# Patient Record
Sex: FEMALE | Race: BLACK OR AFRICAN AMERICAN | Employment: FULL TIME | URBAN - METROPOLITAN AREA
[De-identification: names, ages, dates, MRNs, and addresses within clinical notes are randomized per-mention and may not be internally consistent; named-entity substitution may affect disease eponyms.]

---

## 2017-02-13 ENCOUNTER — OFFICE VISIT (OUTPATIENT)
Dept: FAMILY MEDICINE CLINIC | Age: 47
End: 2017-02-13

## 2017-02-13 VITALS
DIASTOLIC BLOOD PRESSURE: 70 MMHG | WEIGHT: 174 LBS | SYSTOLIC BLOOD PRESSURE: 110 MMHG | RESPIRATION RATE: 14 BRPM | TEMPERATURE: 98 F | HEART RATE: 63 BPM | BODY MASS INDEX: 26.07 KG/M2 | OXYGEN SATURATION: 98 %

## 2017-02-13 DIAGNOSIS — J06.9 VIRAL URI WITH COUGH: ICD-10-CM

## 2017-02-13 DIAGNOSIS — R05.9 COUGH: ICD-10-CM

## 2017-02-13 LAB
INFLUENZA A ANTIBODY: NEGATIVE
INFLUENZA B ANTIBODY: NEGATIVE

## 2017-02-13 PROCEDURE — 87804 INFLUENZA ASSAY W/OPTIC: CPT | Performed by: NURSE PRACTITIONER

## 2017-02-13 PROCEDURE — 99213 OFFICE O/P EST LOW 20 MIN: CPT | Performed by: NURSE PRACTITIONER

## 2017-02-13 RX ORDER — ESTRADIOL 1 MG/1
1 TABLET ORAL DAILY
COMMUNITY
End: 2019-06-10

## 2017-02-13 RX ORDER — FLUTICASONE PROPIONATE 50 MCG
2 SPRAY, SUSPENSION (ML) NASAL DAILY
Qty: 1 BOTTLE | Refills: 0 | Status: SHIPPED | OUTPATIENT
Start: 2017-02-13 | End: 2017-11-20 | Stop reason: ALTCHOICE

## 2017-02-13 ASSESSMENT — ENCOUNTER SYMPTOMS
COUGH: 1
SHORTNESS OF BREATH: 0
NAUSEA: 0
SINUS PAIN: 1
RHINORRHEA: 1
ABDOMINAL PAIN: 0
SORE THROAT: 1
WHEEZING: 0
DIARRHEA: 0
VOMITING: 0

## 2017-06-07 ENCOUNTER — OFFICE VISIT (OUTPATIENT)
Dept: FAMILY MEDICINE CLINIC | Age: 47
End: 2017-06-07

## 2017-06-07 VITALS
DIASTOLIC BLOOD PRESSURE: 80 MMHG | SYSTOLIC BLOOD PRESSURE: 110 MMHG | HEART RATE: 68 BPM | TEMPERATURE: 97.9 F | RESPIRATION RATE: 12 BRPM | BODY MASS INDEX: 26.82 KG/M2 | WEIGHT: 179 LBS

## 2017-06-07 DIAGNOSIS — Z98.890 H/O COLONOSCOPY: ICD-10-CM

## 2017-06-07 DIAGNOSIS — Z12.11 SCREENING FOR COLON CANCER: ICD-10-CM

## 2017-06-07 DIAGNOSIS — K52.9 AGE (ACUTE GASTROENTERITIS): Primary | ICD-10-CM

## 2017-06-07 PROCEDURE — 99213 OFFICE O/P EST LOW 20 MIN: CPT | Performed by: FAMILY MEDICINE

## 2017-06-07 RX ORDER — CITALOPRAM 20 MG/1
20 TABLET ORAL DAILY
Qty: 30 TABLET | Refills: 3 | COMMUNITY
Start: 2017-06-07 | End: 2017-11-20

## 2017-08-05 RX ORDER — LORAZEPAM 0.5 MG/1
TABLET ORAL
Qty: 4 TABLET | Refills: 0 | Status: SHIPPED | OUTPATIENT
Start: 2017-08-05 | End: 2022-06-03 | Stop reason: SDUPTHER

## 2017-11-20 ENCOUNTER — OFFICE VISIT (OUTPATIENT)
Dept: FAMILY MEDICINE CLINIC | Age: 47
End: 2017-11-20

## 2017-11-20 VITALS
BODY MASS INDEX: 28.29 KG/M2 | RESPIRATION RATE: 12 BRPM | HEART RATE: 64 BPM | HEIGHT: 69 IN | DIASTOLIC BLOOD PRESSURE: 60 MMHG | SYSTOLIC BLOOD PRESSURE: 128 MMHG | WEIGHT: 191 LBS | TEMPERATURE: 98.8 F

## 2017-11-20 DIAGNOSIS — Z13.220 SCREENING, LIPID: ICD-10-CM

## 2017-11-20 DIAGNOSIS — E66.3 OVERWEIGHT (BMI 25.0-29.9): ICD-10-CM

## 2017-11-20 DIAGNOSIS — Z00.00 WELL ADULT EXAM: Primary | ICD-10-CM

## 2017-11-20 DIAGNOSIS — Z13.1 SCREENING FOR DIABETES MELLITUS: ICD-10-CM

## 2017-11-20 PROCEDURE — 99396 PREV VISIT EST AGE 40-64: CPT | Performed by: FAMILY MEDICINE

## 2017-11-20 ASSESSMENT — ENCOUNTER SYMPTOMS
GASTROINTESTINAL NEGATIVE: 1
ALLERGIC/IMMUNOLOGIC NEGATIVE: 1
RESPIRATORY NEGATIVE: 1
EYES NEGATIVE: 1

## 2017-11-20 NOTE — PROGRESS NOTES
Subjective:      Patient ID: Ortega Nickerson is a 49 y.o. female. Blood pressure 128/60, pulse 64, temperature 98.8 °F (37.1 °C), temperature source Oral, resp. rate 12, height 5' 8.5\" (1.74 m), weight 191 lb (86.6 kg), not currently breastfeeding. HPI her fro cpx/wellness exam.  Had LADAN/BSO last year for heavy painful periods. Had endometriosis. Doing better since, but has gained weight. Is concerned about this. She goes to gym to exercise, but less since her surgery. About 3-4 days a week for 30-60 min. Probably is eating more. Post surgery had poor po intake and decreased appetite, was eating Acquanetta Se during this time and stopped it. Now back on usual diet (which is likely more calories than the Acquanetta Se planned diet). Lab Results   Component Value Date    CHOL 159 02/08/2016    TRIG 43 02/08/2016    HDL 85 (H) 02/08/2016    LDLCALC 65 02/08/2016     Lab Results   Component Value Date    ALT 9 (L) 02/08/2016    AST 21 02/08/2016        Is not fasting today. Has normal renal function   Lab Results   Component Value Date     02/08/2016    K 3.9 02/08/2016    BUN 12 02/08/2016    CREATININE 0.9 02/08/2016        Dental: sees dentist at least annually. Sees optometrist at least every 2 yrs. No results found for: LABA1C  No results found for: EAG     Patient Active Problem List   Diagnosis    H/O colonoscopy 2004 (for IBS). benign polyps removed.  Obesity (BMI 30-39. 9)      Body mass index is 28.62 kg/m². Wt Readings from Last 3 Encounters:   11/20/17 191 lb (86.6 kg)   06/07/17 179 lb (81.2 kg)   02/13/17 174 lb (78.9 kg)      BP Readings from Last 3 Encounters:   11/20/17 128/60   06/07/17 110/80   02/13/17 110/70      Current Outpatient Prescriptions   Medication Sig Dispense Refill    estradiol (ESTRACE) 1 MG tablet Take 1 mg by mouth daily       No current facility-administered medications for this visit.        Immunization History   Administered Date(s) Administered    Tdap (Boostrix, Adacel) 11/01/2004, 01/13/2016        Family History   Problem Relation Age of Onset    High Blood Pressure Mother     Dementia Mother     High Blood Pressure Sister     Diabetes Sister     Heart Disease Father      CAD     Social History     Social History    Marital status: Single     Spouse name: N/A    Number of children: 0    Years of education: N/A     Occupational History    call       [employer]     Social History Main Topics    Smoking status: Never Smoker    Smokeless tobacco: Never Used    Alcohol use 0.0 oz/week      Comment: socially    Drug use: No    Sexual activity: Yes     Partners: Male     Other Topics Concern    Not on file     Social History Narrative    No narrative on file        Review of Systems   Constitutional: Negative. HENT: Negative. Eyes: Negative. Respiratory: Negative. Cardiovascular: Negative. Gastrointestinal: Negative. Endocrine: Negative. Genitourinary: Negative. Musculoskeletal: Negative. Skin: Negative. Allergic/Immunologic: Negative. Neurological: Negative. Hematological: Negative. Psychiatric/Behavioral: Negative. Objective:   Physical Exam   Constitutional: She is oriented to person, place, and time. She appears well-developed and well-nourished. No distress. HENT:   Head: Normocephalic and atraumatic. Right Ear: External ear normal.   Left Ear: External ear normal.   Nose: Nose normal.   Mouth/Throat: Oropharynx is clear and moist. No oropharyngeal exudate. TM's: nl  Canals: nl  Hearing: nl   Eyes: Conjunctivae and EOM are normal. Pupils are equal, round, and reactive to light. Right eye exhibits no discharge. Left eye exhibits no discharge. Neck: Normal range of motion. Neck supple. No tracheal deviation present. No thyromegaly present. Cardiovascular: Normal rate, regular rhythm, normal heart sounds and intact distal pulses.   Exam reveals no gallop and no

## 2018-04-03 ENCOUNTER — TELEPHONE (OUTPATIENT)
Dept: FAMILY MEDICINE CLINIC | Age: 48
End: 2018-04-03

## 2018-11-19 ENCOUNTER — TELEPHONE (OUTPATIENT)
Dept: FAMILY MEDICINE CLINIC | Age: 48
End: 2018-11-19

## 2018-11-19 NOTE — TELEPHONE ENCOUNTER
All the [facility] doctors are [facility] certified as far as I know, but which doctor she sees would be based on her particular problem. She is due for routine annual check up. I would prefer to see her before ordering labs so that I on ly order what is needed and NOT order things that are not needed. Thanks.

## 2018-11-19 NOTE — TELEPHONE ENCOUNTER
Pt called back I gave the pt the message. The pt stated that she will not schedule a visit due to her insurance will not pay for a office visit that is not Work mis-comp related. The pt asked for an ortho doctor and I gave her a list of [facility] doctors. The pt said that she will call back if she needs anything else. Please advise,    Thanks.

## 2018-11-28 ENCOUNTER — OFFICE VISIT (OUTPATIENT)
Dept: ORTHOPEDIC SURGERY | Age: 48
End: 2018-11-28
Payer: COMMERCIAL

## 2018-11-28 VITALS
SYSTOLIC BLOOD PRESSURE: 166 MMHG | HEIGHT: 69 IN | BODY MASS INDEX: 28.29 KG/M2 | HEART RATE: 70 BPM | WEIGHT: 191 LBS | DIASTOLIC BLOOD PRESSURE: 96 MMHG

## 2018-11-28 DIAGNOSIS — M25.572 LEFT ANKLE PAIN, UNSPECIFIED CHRONICITY: ICD-10-CM

## 2018-11-28 DIAGNOSIS — S93.492A SPRAIN OF ANTERIOR TALOFIBULAR LIGAMENT OF LEFT ANKLE, INITIAL ENCOUNTER: Primary | ICD-10-CM

## 2018-11-28 PROCEDURE — 99243 OFF/OP CNSLTJ NEW/EST LOW 30: CPT | Performed by: ORTHOPAEDIC SURGERY

## 2018-12-01 PROBLEM — S93.492A SPRAIN OF ANTERIOR TALOFIBULAR LIGAMENT OF LEFT ANKLE: Status: ACTIVE | Noted: 2018-12-01

## 2018-12-28 ENCOUNTER — OFFICE VISIT (OUTPATIENT)
Dept: ORTHOPEDIC SURGERY | Age: 48
End: 2018-12-28
Payer: COMMERCIAL

## 2018-12-28 VITALS
HEART RATE: 79 BPM | SYSTOLIC BLOOD PRESSURE: 141 MMHG | WEIGHT: 191 LBS | HEIGHT: 68 IN | BODY MASS INDEX: 28.95 KG/M2 | DIASTOLIC BLOOD PRESSURE: 95 MMHG | RESPIRATION RATE: 16 BRPM

## 2018-12-28 DIAGNOSIS — S93.492A SPRAIN OF ANTERIOR TALOFIBULAR LIGAMENT OF LEFT ANKLE, INITIAL ENCOUNTER: Primary | ICD-10-CM

## 2018-12-28 PROCEDURE — 99213 OFFICE O/P EST LOW 20 MIN: CPT | Performed by: NURSE PRACTITIONER

## 2019-01-24 ENCOUNTER — TELEPHONE (OUTPATIENT)
Dept: ORTHOPEDIC SURGERY | Age: 49
End: 2019-01-24

## 2019-02-08 ENCOUNTER — OFFICE VISIT (OUTPATIENT)
Dept: ORTHOPEDIC SURGERY | Age: 49
End: 2019-02-08
Payer: COMMERCIAL

## 2019-02-08 VITALS
DIASTOLIC BLOOD PRESSURE: 93 MMHG | BODY MASS INDEX: 28.95 KG/M2 | WEIGHT: 191 LBS | HEART RATE: 65 BPM | SYSTOLIC BLOOD PRESSURE: 142 MMHG | RESPIRATION RATE: 16 BRPM | HEIGHT: 68 IN

## 2019-02-08 DIAGNOSIS — S93.492A SPRAIN OF ANTERIOR TALOFIBULAR LIGAMENT OF LEFT ANKLE, INITIAL ENCOUNTER: Primary | ICD-10-CM

## 2019-02-08 PROCEDURE — 99213 OFFICE O/P EST LOW 20 MIN: CPT | Performed by: ORTHOPAEDIC SURGERY

## 2019-03-13 ENCOUNTER — OFFICE VISIT (OUTPATIENT)
Dept: ORTHOPEDIC SURGERY | Age: 49
End: 2019-03-13
Payer: COMMERCIAL

## 2019-03-13 VITALS — WEIGHT: 191 LBS | HEIGHT: 68 IN | BODY MASS INDEX: 28.95 KG/M2 | RESPIRATION RATE: 16 BRPM

## 2019-03-13 DIAGNOSIS — S93.492A SPRAIN OF ANTERIOR TALOFIBULAR LIGAMENT OF LEFT ANKLE, INITIAL ENCOUNTER: Primary | ICD-10-CM

## 2019-03-13 PROCEDURE — 99213 OFFICE O/P EST LOW 20 MIN: CPT | Performed by: ORTHOPAEDIC SURGERY

## 2019-03-20 ENCOUNTER — TELEPHONE (OUTPATIENT)
Dept: ORTHOPEDIC SURGERY | Age: 49
End: 2019-03-20

## 2019-03-25 ENCOUNTER — HOSPITAL ENCOUNTER (OUTPATIENT)
Dept: PHYSICAL THERAPY | Age: 49
Setting detail: THERAPIES SERIES
Discharge: HOME | End: 2019-03-25
Payer: COMMERCIAL

## 2019-03-25 PROCEDURE — 97140 MANUAL THERAPY 1/> REGIONS: CPT

## 2019-03-25 PROCEDURE — 97110 THERAPEUTIC EXERCISES: CPT

## 2019-03-25 PROCEDURE — 97161 PT EVAL LOW COMPLEX 20 MIN: CPT

## 2019-04-04 ENCOUNTER — APPOINTMENT (OUTPATIENT)
Dept: PHYSICAL THERAPY | Age: 49
End: 2019-04-04
Payer: COMMERCIAL

## 2019-04-05 ENCOUNTER — HOSPITAL ENCOUNTER (OUTPATIENT)
Dept: PHYSICAL THERAPY | Age: 49
Setting detail: THERAPIES SERIES
Discharge: HOME | End: 2019-04-05
Payer: COMMERCIAL

## 2019-04-05 PROCEDURE — 97110 THERAPEUTIC EXERCISES: CPT

## 2019-04-05 PROCEDURE — 97140 MANUAL THERAPY 1/> REGIONS: CPT

## 2019-04-05 NOTE — FLOWSHEET NOTE
Physical Therapy Daily Treatment Note  Date:  2019    Patient Name:  Lydia Yeager    :  1970  MRN: [id number]  Restrictions/Precautions:    Pertinent Medical History:HTN, pt reports being of good health otherwise  Medical/Treatment Diagnosis Information:  · Diagnosis:  Sprain of anterior talofibular ligament of left ankle, initial encounter (Q89.904J ICD-10-CM)  · Treatment Diagnosis: Reduced tolernce to LLE activity and weight bearing due to ATFL sprain  Insurance/Certification information:  PT Insurance Information: [address] 12 sessions from 3/18/19 to 19  Physician Information:  Referring Practitioner: Dr Kandace Cole of care signed (Y/N):  Routed 3/25/19   Visit# / total visits:   ( authorized to 19)  Pain level: 1/10     G-Code (if applicable):      Date / Visit # G-Code Applied:  /       Progress Note: []  Yes  [x]  No  Next due by: Visit #10      History of Injury:pt tripped and fell over a mat at work on Oct. 5 2018, pt is presently working as a , activities include working out at [employer] and walking        Subjective: c/o intermittent left anterior  ankle 0-2/10, Increased pain with standing, walking on uneven terain and sqautting, decreased pain with elevation and ice, sleep is not disturbed by from onset pain feels about the same  19 Pt reports minimal ankle pain. Objective:  Observation:   Test measurements:      Exercises:  Exercise/Equipment Resistance/Repetitions Other comments   Bike L8 x 5 min    incline 1 min x 2    Leg Press Bilat 75# 20 x 90# 20 x 2    HR/TR 30 x ea Cues for posture/ tech. SLS with opp.  Foot on step 10\" alt 10 x ea              Mat ex     T band ankle 4 way  Tolerated well   Seated baps                     HEP     Ankle df/pf and inv/ev  30 x ea pain free rom 3/25/19, 19 Cues required to avoid end range and pain   T band ankle 4 way mid rom ADD                Other Therapeutic Activities:      Home Exercise Program:   3/25/19 The patient demonstrated good tolerance to and understanding of the HEP. Written instructions have been issued. Manual Treatments:12  DTM/MFR to left ant tib. , post tib.  And peroneals  Moderate friction massage to left atfl x 1 min  Kinesio tape to left lateral ankle with heel lock      Modalities:   CP left ankle 10 min     Timed Code Treatment Minutes:  50    Total Treatment Minutes:  60       BWC TIME CODES    MODALITY                      START TIME   STOP TIME       Man rx  5:40   6:00   Thera ex 2:30 3:20   CP  3:20 3:30            Assessment  [x] Patient tolerated treatment well [] Patient limited by fatigue  [] Patient limited by pain  [] Patient limited by other medical complications  [] Other:         Prognosis: [x] Good [x] Fair  [] Poor    Patient Requires Follow-up: [x] Yes  [] No    Plan:   [x] Continue per plan of care [] Alter current plan (see comments)  [x] Plan of care initiated [] Hold pending MD visit [] Discharge    Plan for Next Session:  ADD t band to HEP                                    consider pulsed US to left lat ankle as needed     Electronically signed by:  Jennifer Owens, PT

## 2019-04-08 ENCOUNTER — HOSPITAL ENCOUNTER (OUTPATIENT)
Dept: PHYSICAL THERAPY | Age: 49
Setting detail: THERAPIES SERIES
Discharge: HOME | End: 2019-04-08
Payer: COMMERCIAL

## 2019-04-08 PROCEDURE — 97110 THERAPEUTIC EXERCISES: CPT

## 2019-04-08 NOTE — FLOWSHEET NOTE
and pain   T band ankle 4 way mid rom 30 x ea orange 4/8               Other Therapeutic Activities:      Home Exercise Program:   4/8/19 The patient demonstrated good tolerance to and understanding of the HEP. Written instructions have been issued.      Manual Treatments:    Modalities:   CP left ankle 10 min     Timed Code Treatment Minutes:  42    Total Treatment Minutes:  54       BWC TIME CODES    MODALITY                      START TIME   STOP TIME       Man rx     Thera ex 4:30 5:12   CP  5:15 5:25            Assessment  [x] Patient tolerated treatment well [] Patient limited by fatigue  [] Patient limited by pain  [] Patient limited by other medical complications  [] Other:         Prognosis: [x] Good [x] Fair  [] Poor    Patient Requires Follow-up: [x] Yes  [] No    Plan:   [x] Continue per plan of care [] Alter current plan (see comments)  [x] Plan of care initiated [] Hold pending MD visit [] Discharge    Plan for Next Session:  Review t band  HEP                                    consider pulsed US to left lat ankle as needed     Electronically signed by:  Cheng Gates PT

## 2019-04-09 ENCOUNTER — HOSPITAL ENCOUNTER (OUTPATIENT)
Dept: PHYSICAL THERAPY | Age: 49
Setting detail: THERAPIES SERIES
Discharge: HOME | End: 2019-04-09
Payer: COMMERCIAL

## 2019-04-09 PROCEDURE — 97110 THERAPEUTIC EXERCISES: CPT

## 2019-04-09 NOTE — FLOWSHEET NOTE
Physical Therapy Daily Treatment Note  Date:  2019    Patient Name:  Irene Caro    :  1970  MRN: [id number]  Restrictions/Precautions:    Pertinent Medical History:HTN, pt reports being of good health otherwise  Medical/Treatment Diagnosis Information:  · Diagnosis:  Sprain of anterior talofibular ligament of left ankle, initial encounter (A58.691B ICD-10-CM)  · Treatment Diagnosis: Reduced tolernce to LLE activity and weight bearing due to ATFL sprain  Insurance/Certification information:  PT Insurance Information: [address] 12 sessions from 3/18/19 to 19  Physician Information:  Referring Practitioner: Dr Marbin Gatica of care signed (Y/N):  Routed 3/25/19   Visit# / total visits:   ( authorized to 19)  Pain level: 0/10     G-Code (if applicable):      Date / Visit # G-Code Applied:  /       Progress Note: []  Yes  [x]  No  Next due by: Visit #10      History of Injury:pt tripped and fell over a mat at work on Oct. 5 2018, pt is presently working as a , activities include working out at [employer] and walking        Subjective: c/o intermittent left anterior  ankle 0-2/10, Increased pain with standing, walking on uneven terain and sqautting, decreased pain with elevation and ice, sleep is not disturbed by from onset pain feels about the same  19 Pt reports minimal ankle pain. 19 Pt reports no ankle foot pain today. 19 Patient reports ankle feels fine. Objective:  Observation:   Test measurements:      Exercises:  Exercise/Equipment Resistance/Repetitions Other comments   Bike L8 x 5 min    incline 1 min x 2    Leg Press Bilat 95# 20 x 2  75# 20 x 2    HR/TR 30 x ea Cues for posture/ tech. SLS floor on airex 10\" alt 10 x ea Consider Airex   Wobble DF/PF Bilat. 30 x ea ADD LLE                  Mat ex     T band ankle 4 way 30 x ea orange  Tolerated well   Seated baps                     HEP     Ankle df/pf and inv/ev  30 x ea pain free rom 3/25/19, 4/5/19 Cues required to avoid end range and pain   T band ankle 4 way mid rom 30 x ea orange 4/8               Other Therapeutic Activities:      Home Exercise Program:   4/8/19 The patient demonstrated good tolerance to and understanding of the HEP. Written instructions have been issued.      Manual Treatments:    Modalities:   CP left ankle 10 min     Timed Code Treatment Minutes:  42    Total Treatment Minutes:  54       BWC TIME CODES    MODALITY                      START TIME   STOP TIME       Man rx     Thera ex 4:30 5:12   CP  5:15 5:25            Assessment  [x] Patient tolerated treatment well [] Patient limited by fatigue  [] Patient limited by pain  [] Patient limited by other medical complications  [] Other:         Prognosis: [x] Good [x] Fair  [] Poor    Patient Requires Follow-up: [x] Yes  [] No    Plan:   [x] Continue per plan of care [] Alter current plan (see comments)  [x] Plan of care initiated [] Hold pending MD visit [] Discharge    Plan for Next Session:  Review t band  HEP                                    consider pulsed US to left lat ankle as needed     Electronically signed by:  Paulino Hudson PTA

## 2019-04-16 ENCOUNTER — HOSPITAL ENCOUNTER (OUTPATIENT)
Dept: PHYSICAL THERAPY | Age: 49
Setting detail: THERAPIES SERIES
Discharge: HOME | End: 2019-04-16
Payer: COMMERCIAL

## 2019-04-16 PROCEDURE — 97110 THERAPEUTIC EXERCISES: CPT

## 2019-04-16 PROCEDURE — 97140 MANUAL THERAPY 1/> REGIONS: CPT

## 2019-04-16 NOTE — FLOWSHEET NOTE
Tolerated well   Seated baps                     HEP     Ankle df/pf and inv/ev  30 x ea pain free rom 3/25/19, 4/5/19 Cues required to avoid end range and pain   T band ankle 4 way mid rom 30 x ea orange 4/8, 4/16 some cues for tech. Other Therapeutic Activities:      Home Exercise Program:   4/8/19 The patient demonstrated good tolerance to and understanding of the HEP. Written instructions have been issued. Manual Treatments: 12 min  DTM/MFR to left ant tib. , post tib.  And peroneals  Moderate friction massage to left atfl   Modalities:   n     Timed Code Treatment Minutes:  45    Total Treatment Minutes:  50       BWC TIME CODES    MODALITY                      START TIME   STOP TIME       Man rx 8:20 8:30   Thera ex 8:05  8:30 8:20  8:55   CP               Assessment  [x] Patient tolerated treatment well [] Patient limited by fatigue  [] Patient limited by pain  [] Patient limited by other medical complications  [] Other:         Prognosis: [x] Good [x] Fair  [] Poor    Patient Requires Follow-up: [x] Yes  [] No    Plan:   [x] Continue per plan of care [] Alter current plan (see comments)  [x] Plan of care initiated [] Hold pending MD visit [] Discharge    Plan for Next Session:  ADD Baps to thera Ex                                    consider pulsed US to left lat ankle as needed     Electronically signed by:  Charmaine Ospina, PT

## 2019-04-18 ENCOUNTER — HOSPITAL ENCOUNTER (OUTPATIENT)
Dept: PHYSICAL THERAPY | Age: 49
Setting detail: THERAPIES SERIES
Discharge: HOME | End: 2019-04-18
Payer: COMMERCIAL

## 2019-04-18 PROCEDURE — 97110 THERAPEUTIC EXERCISES: CPT

## 2019-04-18 PROCEDURE — 97140 MANUAL THERAPY 1/> REGIONS: CPT

## 2019-04-18 NOTE — FLOWSHEET NOTE
Physical Therapy Daily Treatment Note  Date:  2019    Patient Name:  Glenn Baltazar    :  1970  MRN: [id number]  Restrictions/Precautions:    Pertinent Medical History:HTN, pt reports being of good health otherwise  Medical/Treatment Diagnosis Information:  · Diagnosis:  Sprain of anterior talofibular ligament of left ankle, initial encounter (W08.576U ICD-10-CM)  · Treatment Diagnosis: Reduced tolernce to LLE activity and weight bearing due to ATFL sprain  Insurance/Certification information:  PT Insurance Information: [address] 12 sessions from 3/18/19 to 19  Physician Information:  Referring Practitioner: Dr Luz Maria Prater of care signed (Y/N):  Routed 3/25/19   Visit# / total visits:   ( authorized to 19)  Pain level: 0/10     G-Code (if applicable):      Date / Visit # G-Code Applied:  /       Progress Note: []  Yes  [x]  No  Next due by: Visit #10      History of Injury:pt tripped and fell over a mat at work on Oct. 5 2018, pt is presently working as a , activities include working out at [employer] and walking        Subjective: c/o intermittent left anterior  ankle 0-2/10, Increased pain with standing, walking on uneven terain and sqautting, decreased pain with elevation and ice, sleep is not disturbed by from onset pain feels about the same  19 Pt reports minimal ankle pain. 19 Pt reports no ankle foot pain today. 19 Patient reports ankle feels fine. 19 Pt reports no pain or problems at her ankle/ foot. 19 15 minutes late Pt reports she had some pain last night but has none today. Objective:  Observation:   Test measurements:      Exercises:  Exercise/Equipment Resistance/Repetitions Other comments   Bike    incline    Leg Press    HR/TR Cues for posture/ tech. SLS floor on airex    Wobble DF/PF Bilat. 30 x   LLE 30 x    Baps standing pwb'ing 4 way L1 balance point 3 min  Df/pf 3 min  Inv/ ev 3 min    s          Mat ex T band ankle 4 way 30 x ea orange  Tolerated well   Seated baps                     HEP     Ankle df/pf and inv/ev  30 x ea pain free rom 3/25/19, 4/5/19 Cues required to avoid end range and pain   T band ankle 4 way mid rom 30 x ea orange 4/8, 4/16 some cues for tech. Other Therapeutic Activities:      Home Exercise Program:   4/8/19 The patient demonstrated good tolerance to and understanding of the HEP. Written instructions have been issued. Manual Treatments: 14 min  DTM/MFR to left ant tib. , post tib. And peroneals  Moderate friction massage to left atfl   Modalities:   CP left ankle 10 min     Timed Code Treatment Minutes:       Total Treatment Minutes:  39       BWC TIME CODES    MODALITY                      START TIME   STOP TIME       Man rx 8:50 9:04   Thera ex 8:45  9:04 8:50  9:16   CP  9:20 9:30            Assessment  [x] Patient tolerated treatment well [] Patient limited by fatigue  [] Patient limited by pain  [] Patient limited by other medical complications  [] Other:         Prognosis: [x] Good [x] Fair  [] Poor    Patient Requires Follow-up: [x] Yes  [] No    Plan:   [x] Continue per plan of care [] Alter current plan (see comments)  [x] Plan of care initiated [] Hold pending MD visit [] Discharge    Plan for Next Session:  ADD cw/ ccw to  Baps                                    consider pulsed US to left lat ankle as needed     Electronically signed by:  Charmaine Ospina, PT

## 2019-05-01 ENCOUNTER — HOSPITAL ENCOUNTER (OUTPATIENT)
Dept: PHYSICAL THERAPY | Age: 49
Setting detail: THERAPIES SERIES
Discharge: HOME | End: 2019-05-01
Payer: COMMERCIAL

## 2019-05-06 ENCOUNTER — HOSPITAL ENCOUNTER (OUTPATIENT)
Dept: PHYSICAL THERAPY | Age: 49
Setting detail: THERAPIES SERIES
Discharge: HOME | End: 2019-05-06
Payer: COMMERCIAL

## 2019-05-06 PROCEDURE — 97110 THERAPEUTIC EXERCISES: CPT

## 2019-05-06 PROCEDURE — 97140 MANUAL THERAPY 1/> REGIONS: CPT

## 2019-05-06 NOTE — FLOWSHEET NOTE
DF/PF Bilat. 30 x   LLE 30 x    Baps standing pwb'ing 4 way L1 balance point 3 min  Df/pf 3 min  Inv/ ev 3 min    s          Mat ex     T band ankle 4 way 30 x ea orange  Tolerated well   Seated baps                     HEP     Ankle df/pf and inv/ev  30 x ea pain free rom 3/25/19, 4/5/19 Cues required to avoid end range and pain   T band ankle 4 way mid rom 30 x ea orange 4/8, 4/16 some cues for tech. Other Therapeutic Activities:      Home Exercise Program:   4/8/19 The patient demonstrated good tolerance to and understanding of the HEP. Written instructions have been issued. Manual Treatments: 15 min  DTM/MFR to left ant tib. , post tib. And peroneals  Moderate friction massage to left atfl   Modalities:   CP left ankle 10 min     Timed Code Treatment Minutes:       Total Treatment Minutes:  39       BWC TIME CODES    MODALITY                      START TIME   STOP TIME       Man rx 4:40 4;55   Thera ex 4:30  4:55 4:40  5:15   CP  5:15 5:25            Assessment  [x] Patient tolerated treatment well [] Patient limited by fatigue  [] Patient limited by pain  [] Patient limited by other medical complications  [] Other:         Prognosis: [x] Good [x] Fair  [] Poor    Patient Requires Follow-up: [x] Yes  [] No    Plan:   [x] Continue per plan of care [] Alter current plan (see comments)  [x] Plan of care initiated [] Hold pending MD visit [] Discharge    Plan for Next Session:  ADD cw/ ccw to  Baps                                    consider pulsed US to left lat ankle as needed     Electronically signed by:  Altagracia Adames PTA

## 2019-05-07 ENCOUNTER — HOSPITAL ENCOUNTER (OUTPATIENT)
Dept: PHYSICAL THERAPY | Age: 49
Setting detail: THERAPIES SERIES
Discharge: HOME | End: 2019-05-07
Payer: COMMERCIAL

## 2019-05-07 PROCEDURE — 97140 MANUAL THERAPY 1/> REGIONS: CPT

## 2019-05-07 PROCEDURE — 97110 THERAPEUTIC EXERCISES: CPT

## 2019-05-07 NOTE — FLOWSHEET NOTE
Physical Therapy Daily Treatment Note  Date:  2019    Patient Name:  Kiran Mitchell    :  1970  MRN: [id number]  Restrictions/Precautions:    Pertinent Medical History:HTN, pt reports being of good health otherwise  Medical/Treatment Diagnosis Information:  · Diagnosis:  Sprain of anterior talofibular ligament of left ankle, initial encounter (D37.702M ICD-10-CM)  · Treatment Diagnosis: Reduced tolernce to LLE activity and weight bearing due to ATFL sprain  Insurance/Certification information:  PT Insurance Information: [address] 12 sessions from 3/18/19 to 19  Physician Information:  Referring Practitioner: Dr Angela Bridges of care signed (Y/N):  Routed 3/25/19   Visit# / total visits:   ( authorized to 19)  Pain level: 0/10     G-Code (if applicable):      Date / Visit # G-Code Applied:  /       Progress Note: []  Yes  [x]  No  Next due by: Visit #10      History of Injury:pt tripped and fell over a mat at work on Oct. 5 2018, pt is presently working as a , activities include working out at [employer] and walking        Subjective: c/o intermittent left anterior  ankle 0-2/10, Increased pain with standing, walking on uneven terain and sqautting, decreased pain with elevation and ice, sleep is not disturbed by from onset pain feels about the same  19 Pt reports minimal ankle pain. 19 Pt reports no ankle foot pain today. 19 Patient reports ankle feels fine. 19 Pt reports no pain or problems at her ankle/ foot. 19 15 minutes late Pt reports she had some pain last night but has none today. 19 Patient reports only slight soreness,overall is feeling better. 19 Pt reports no pain or problems with her ankle/ foot today.            Objective:  Observation:   Test measurements:19  Left ankle foot strength   DF,inv and pf  Ev 5-/5    Exercises:  Exercise/Equipment Resistance/Repetitions Other comments   Bike L8 x 5 min   incline 1 min x 2    Leg Press Bilat 95# 20 x 3      HR/TR Cues for posture/ tech. SLS floor on airex airex 10\" alt 10 x   Wobble DF/PF Bilat. 2 min  LLE 30 x    Baps standing pwb'ing 4 way L1 balance point 1 min  Df/pf 30 x  Cw/ ccw  1 min each    Step ups fwd 15 x ea L/R          Mat ex     T band ankle 4 way 30 x ea orange  Tolerated well                        HEP     Ankle df/pf and inv/ev  30 x ea pain free rom 3/25/19, 4/5/19 Cues required to avoid end range and pain   T band ankle 4 way mid rom 30 x ea orange 4/8, 4/16 some cues for tech. Other Therapeutic Activities:      Home Exercise Program:   4/8/19 The patient demonstrated good tolerance to and understanding of the HEP. Written instructions have been issued. Manual Treatments: 15 min  DTM/MFR to left ant tib. , post tib.  And peroneals  Moderate friction massage to left atfl   Modalities:   CP left ankle 10 min     Timed Code Treatment Minutes:  50    Total Treatment Minutes: 72       BWC TIME CODES    MODALITY                      START TIME   STOP TIME       Man rx 3:42 4:00   Thera ex 3:30  4:00 3:40  4:25   CP  4:25 4:35            Assessment  [x] Patient tolerated treatment well [] Patient limited by fatigue  [] Patient limited by pain  [] Patient limited by other medical complications  [] Other:         Prognosis: [x] Good [x] Fair  [] Poor    Patient Requires Follow-up: [x] Yes  [] No    Plan:   [x] Continue per plan of care [] Alter current plan (see comments)  [x] Plan of care initiated [] Hold pending MD visit [] Discharge    Plan for Next Session:  ADD plyometrics on reformer    Electronically signed by:  Sanjay Power PT

## 2019-05-09 ENCOUNTER — HOSPITAL ENCOUNTER (OUTPATIENT)
Dept: PHYSICAL THERAPY | Age: 49
Setting detail: THERAPIES SERIES
Discharge: HOME | End: 2019-05-09
Payer: COMMERCIAL

## 2019-05-09 PROCEDURE — 97140 MANUAL THERAPY 1/> REGIONS: CPT

## 2019-05-09 PROCEDURE — 97110 THERAPEUTIC EXERCISES: CPT

## 2019-05-09 NOTE — FLOWSHEET NOTE
Physical Therapy Daily Treatment Note  Date:  2019    Patient Name:  Malcolm Pemberton    :  1970  MRN: [id number]  Restrictions/Precautions:    Pertinent Medical History:HTN, pt reports being of good health otherwise  Medical/Treatment Diagnosis Information:  · Diagnosis:  Sprain of anterior talofibular ligament of left ankle, initial encounter (G42.917W ICD-10-CM)  · Treatment Diagnosis: Reduced tolernce to LLE activity and weight bearing due to ATFL sprain  Insurance/Certification information:  PT Insurance Information: [id number] 12 sessions from 3/18/19 to 19  Physician Information:  Referring Practitioner: Dr Stone Mclaughlin of care signed (Y/N):  Routed 3/25/19   Visit# / total visits:   ( authorized to 19)  Pain level: 0/10     G-Code (if applicable):      Date / Visit # G-Code Applied:  /       Progress Note: []  Yes  [x]  No  Next due by: Visit #10      History of Injury:pt tripped and fell over a mat at work on Oct. 5 2018, pt is presently working as a , activities include working out at [employer] and walking        Subjective: c/o intermittent left anterior  ankle 0-2/10, Increased pain with standing, walking on uneven terain and sqautting, decreased pain with elevation and ice, sleep is not disturbed by from onset pain feels about the same  19 Pt reports minimal ankle pain. 19 Pt reports no ankle foot pain today. 19 Patient reports ankle feels fine. 19 Pt reports no pain or problems at her ankle/ foot. 19 15 minutes late Pt reports she had some pain last night but has none today. 19 Patient reports only slight soreness,overall is feeling better. 19 Pt reports no pain or problems with her ankle/ foot today. 19 Pt reports no pain or dysfunction observed.             Objective:  Observation:   Test measurements:19  Left ankle foot strength   DF,inv and pf  Ev 5-/    Exercises:  Exercise/Equipment Resistance/Repetitions Other comments   Bike L8 x 5 min   incline 1 min x 2    Leg Press Bilat 95# 20 x 3      HR/TR Cues for posture/ tech. SLS floor on airex airex 10\" alt 10 x   Wobble DF/PF Bilat. 2 min  LLE 30 x    Baps standing pwb'ing 4 way L1 balance point 1 min  Df/pf 30 x  Cw/ ccw  1 min each    Step ups fwd 15 x ea L/R     Reformer plyo's 2 springs 15 x 2              Mat ex     T band ankle 4 way 30 x ea orange  Tolerated well                        HEP     Ankle df/pf and inv/ev  30 x ea pain free rom 3/25/19, 4/5/19 Cues required to avoid end range and pain   T band ankle 4 way mid rom 30 x ea orange 4/8, 4/16 some cues for tech. Hr/tr ADD    sls ADD                Other Therapeutic Activities:      Home Exercise Program:   4/8/19 The patient demonstrated good tolerance to and understanding of the HEP. Written instructions have been issued. Manual Treatments: 10 min  DTM/MFR to left ant tib. , post tib.    Moderate friction massage to left atfl   Modalities:   CP left ankle 10 min     Timed Code Treatment Minutes:  45    Total Treatment Minutes: 60       BWC TIME CODES    MODALITY                      START TIME   STOP TIME       Man rx 4:05 4:15   Thera ex 3:30   4:05     CP  4:15 4:25            Assessment  [x] Patient tolerated treatment well [] Patient limited by fatigue  [] Patient limited by pain  [] Patient limited by other medical complications  [] Other:         Prognosis: [x] Good [x] Fair  [] Poor    Patient Requires Follow-up: [x] Yes  [] No    Plan:   [x] Continue per plan of care [] Alter current plan (see comments)  [x] Plan of care initiated [] Hold pending MD visit [] Discharge    Plan for Next Session:  Reassess     Electronically signed by:  Pollo Pro PT

## 2019-05-29 ENCOUNTER — OFFICE VISIT (OUTPATIENT)
Dept: ORTHOPEDIC SURGERY | Age: 49
End: 2019-05-29
Payer: COMMERCIAL

## 2019-05-29 VITALS
BODY MASS INDEX: 28.95 KG/M2 | HEIGHT: 68 IN | RESPIRATION RATE: 16 BRPM | HEART RATE: 66 BPM | DIASTOLIC BLOOD PRESSURE: 92 MMHG | SYSTOLIC BLOOD PRESSURE: 142 MMHG | WEIGHT: 191 LBS

## 2019-05-29 DIAGNOSIS — S93.492A SPRAIN OF ANTERIOR TALOFIBULAR LIGAMENT OF LEFT ANKLE, INITIAL ENCOUNTER: Primary | ICD-10-CM

## 2019-05-29 PROCEDURE — 99213 OFFICE O/P EST LOW 20 MIN: CPT | Performed by: ORTHOPAEDIC SURGERY

## 2019-05-29 NOTE — DISCHARGE SUMMARY
Physical Therapy Discharge Note  Date: 2019    Patient Name: Sushil Alonzo  : 1970  MRN: [id number]    Pertinent Medical History:HTN, pt reports being of good health otherwise  Medical/Treatment Diagnosis Information:  · Diagnosis:  Sprain of anterior talofibular ligament of left ankle, initial encounter (Z94.033Q ICD-10-CM)  · Treatment Diagnosis: Reduced tolernce to LLE activity and weight bearing due to ATFL sprain  Insurance/Certification information:  PT Insurance Information: [facility] 12 sessions from 3/18/19 to 19  Physician Information:  Referring Practitioner: Dr Erica Evans        Dates of Service:3/25/19 to 19  Total # of Visits:9  Cancel/No Shows to date:1 can    Medicare Cap Total for 2019:    G-code (if applicable):    Date G-code Applied:       Treatment to Date:  [x] Therapeutic Exercise  [x] Modalities:  [x] Therapeutic Activity     [] Ultrasound  [] Electrical Stim   [] Gait Training      [] Cervical Traction    [] Lumbar Traction  [x] Neuromuscular Re-education  [x] Cold/hotpack [] Iontophoresis  [x] Instruction in HEP      Other:  [x] Manual Therapy       []    [] Aquatic Therapy       []                    ? Significant Findings At Last Visit:    Subjective:19 Pt reports no pain or dysfunction observed. Objective:  Observation:Gait pattern is wnl's without use of a device  · Test measurements:  19  Left ankle foot strength   DF,inv and pf 5/5 Ev 5-/5             Goal Status:  [] Achieved [x] Partially Achieved  [] Not Achieved     Reason for Discharge:  [] Goals Met   [] Patient did not return after initial evaluation   [] Progress Plateaued [] Missed _____ scheduled appointments   [] No insurance coverage [] Patient terminated therapy   [x] Other: Insurance Auth. Ended 19 and no further contact with pt.         PT recommendation:   [x] Patient now discharged with HEP      [] Other:    Discharge discussed with:  [] Patient  [] Caregiver      [x] Other Not discussed        Electronically signed by:  Breanne Carlson PT    If you have any questions or concerns, please don't hesitate to call.   Thank you for your referral.

## 2019-05-29 NOTE — LETTER
[facility]  [address] [facility] [address]  [address] [place] [address] [address]  Phone: [phone]  Fax: [phone]    Ismael Bond MD        May 29, 2019     Patient: Alvin Harris   YOB: 1970   Date of Visit: 5/29/2019       To Whom it May Concern:    Alvin Harris was seen in my clinic on 5/29/2019. If you have any questions or concerns, please don't hesitate to call.     Sincerely,     Ismael Bond MD

## 2019-06-01 NOTE — PROGRESS NOTES
CHIEF COMPLAINT: Left ankle pain/ Ankle sprain. DATE OF INJURY: 10/5/2018, worker's comp    HISTORY: Carlee Willingham 51 y.o.  female presents today for follow up of left ankle pain. She was initially seen on 11/28/2018 as a consultation request from Zoey Cole MD for evaluation of a left ankle injury which occurred when she was walking and her foot, and the carpet by the door causing her to trip while at work. She had mild swelling, no bruising at the time of the injury. She states that her pain is improving. She has mild achy pain after walking all day. Pain occurs at the end of the day. We ordered physical therapy for her at her last and got approved, but she did not know about it. She initially presented to urgent care because of the pain. She was told that she has a sprain, ace wrap was applied and asked to f/u with Orthopedics. She saw the worker's comp physician and was managed his ice and Ace wrap and rest with some improvement. Rates pain a 0/10 VAS mild achy intermittent lateral ankle. She reports today no pain. No numbness or tingling sensation, and no other complaint. 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 She has been back to work full duty.       Past Medical History:   Diagnosis Date    HTN (hypertension)     IBS (irritable bowel syndrome)     Lactose intolerance        Past Surgical History:   Procedure Laterality Date    CYST REMOVAL      LADAN AND BSO  10/27/2016    with lysis of adhesions; Dr Kim Otero       Social History     Socioeconomic History    Marital status: Single     Spouse name: Not on file    Number of children: 0    Years of education: Not on file    Highest education level: Not on file   Occupational History    Occupation: call      Comment: [employer]   Social Needs    Financial resource strain: Not on file    Food insecurity:     Worry: Not on file     Inability: Not on file    Transportation needs:     Medical: Not on file     Non-medical: Not on file   Tobacco Use    Smoking status: Never Smoker    Smokeless tobacco: Never Used   Substance and Sexual Activity    Alcohol use: Yes     Alcohol/week: 0.0 oz     Comment: socially    Drug use: No    Sexual activity: Yes     Partners: Male   Lifestyle    Physical activity:     Days per week: Not on file     Minutes per session: Not on file    Stress: Not on file   Relationships    Social connections:     Talks on phone: Not on file     Gets together: Not on file     Attends Sabianism service: Not on file     Active member of club or organization: Not on file     Attends meetings of clubs or organizations: Not on file     Relationship status: Not on file    Intimate partner violence:     Fear of current or ex partner: Not on file     Emotionally abused: Not on file     Physically abused: Not on file     Forced sexual activity: Not on file   Other Topics Concern    Not on file   Social History Narrative    Not on file       Family History   Problem Relation Age of Onset    High Blood Pressure Mother     Dementia Mother     Heart Disease Father         CAD    High Blood Pressure Sister     Diabetes Sister        Current Outpatient Medications on File Prior to Visit   Medication Sig Dispense Refill    estradiol (ESTRACE) 1 MG tablet Take 1 mg by mouth daily       No current facility-administered medications on file prior to visit. Pertinent items are noted in HPI  Review of systems reviewed from Patient History Form dated on 11/28/2018 and available in the patient's chart under the Media tab. No change noted. PHYSICAL EXAMINATION:  Ms. Virl Goltz is a very pleasant 51 y.o.  female who presents today in no acute distress, awake, alert, and oriented. She is well dressed, nourished and  groomed. Patient with normal affect. Height is  5' 8\" (1.727 m), weight is 191 lb (86.6 kg), Body mass index is 29.04 kg/m². Resting respiratory rate is 16. Examination of the gait, showed that the patient walks with no limp, WB left leg. Examination of both ankles showing a full range of motion of the left ankle compare to the other side. There is no swelling that can be seen, no ecchymosis over lateral side of the left ankle. She has intact sensation and good pedal pulses. She has minimal to no tenderness on deep palpation over the left ankle ATF. The ankles are stable to drawer test bilaterally, equally. Ankle reflex 1+ bilaterally. Good strength, and no instability both upper and lower extremities. IMAGING:  Xray's dated 11/28/2018 in office,  were reviewed, 3 views of the left ankle, and showed no acute fracture. Ankle mortise in anatomic position. IMPRESSION: Left ankle sprain. PLAN:   She can be WBAT and normal activity. She was instructed to work on peroneal muscle strength, which was demonstrated to her today an office. She can take NSAIDs as needed. She can go back to normal activity with no restrictions. She will be seen PRN.          Joseph Patten MD

## 2019-06-10 ENCOUNTER — OFFICE VISIT (OUTPATIENT)
Dept: FAMILY MEDICINE CLINIC | Age: 49
End: 2019-06-10
Payer: COMMERCIAL

## 2019-06-10 VITALS
DIASTOLIC BLOOD PRESSURE: 92 MMHG | SYSTOLIC BLOOD PRESSURE: 140 MMHG | HEIGHT: 69 IN | BODY MASS INDEX: 31.7 KG/M2 | WEIGHT: 214 LBS | TEMPERATURE: 98.6 F | HEART RATE: 66 BPM

## 2019-06-10 DIAGNOSIS — N95.1 MENOPAUSAL SYMPTOMS: ICD-10-CM

## 2019-06-10 DIAGNOSIS — K58.0 IRRITABLE BOWEL SYNDROME WITH DIARRHEA: ICD-10-CM

## 2019-06-10 DIAGNOSIS — Z00.00 WELL ADULT EXAM: Primary | ICD-10-CM

## 2019-06-10 DIAGNOSIS — Z13.1 SCREENING FOR DIABETES MELLITUS: ICD-10-CM

## 2019-06-10 DIAGNOSIS — Z13.220 SCREENING, LIPID: ICD-10-CM

## 2019-06-10 DIAGNOSIS — E66.09 CLASS 1 OBESITY DUE TO EXCESS CALORIES WITH SERIOUS COMORBIDITY AND BODY MASS INDEX (BMI) OF 32.0 TO 32.9 IN ADULT: ICD-10-CM

## 2019-06-10 DIAGNOSIS — I10 ESSENTIAL HYPERTENSION: ICD-10-CM

## 2019-06-10 PROBLEM — E66.811 CLASS 1 OBESITY DUE TO EXCESS CALORIES WITH SERIOUS COMORBIDITY AND BODY MASS INDEX (BMI) OF 32.0 TO 32.9 IN ADULT: Status: ACTIVE | Noted: 2019-06-10

## 2019-06-10 LAB
A/G RATIO: 1.4 (ref 1.1–2.2)
ALBUMIN SERPL-MCNC: 4.6 G/DL (ref 3.4–5)
ALP BLD-CCNC: 116 U/L (ref 40–129)
ALT SERPL-CCNC: 15 U/L (ref 10–40)
ANION GAP SERPL CALCULATED.3IONS-SCNC: 14 MMOL/L (ref 3–16)
AST SERPL-CCNC: 23 U/L (ref 15–37)
BILIRUB SERPL-MCNC: 0.3 MG/DL (ref 0–1)
BUN BLDV-MCNC: 12 MG/DL (ref 7–20)
CALCIUM SERPL-MCNC: 10 MG/DL (ref 8.3–10.6)
CHLORIDE BLD-SCNC: 103 MMOL/L (ref 99–110)
CHOLESTEROL, TOTAL: 186 MG/DL (ref 0–199)
CO2: 25 MMOL/L (ref 21–32)
CREAT SERPL-MCNC: 0.8 MG/DL (ref 0.6–1.1)
GFR AFRICAN AMERICAN: >60
GFR NON-AFRICAN AMERICAN: >60
GLOBULIN: 3.2 G/DL
GLUCOSE BLD-MCNC: 73 MG/DL (ref 70–99)
HDLC SERPL-MCNC: 76 MG/DL (ref 40–60)
LDL CHOLESTEROL CALCULATED: 97 MG/DL
POTASSIUM SERPL-SCNC: 4.5 MMOL/L (ref 3.5–5.1)
SODIUM BLD-SCNC: 142 MMOL/L (ref 136–145)
TOTAL PROTEIN: 7.8 G/DL (ref 6.4–8.2)
TRIGL SERPL-MCNC: 65 MG/DL (ref 0–150)
VLDLC SERPL CALC-MCNC: 13 MG/DL

## 2019-06-10 PROCEDURE — 99396 PREV VISIT EST AGE 40-64: CPT | Performed by: FAMILY MEDICINE

## 2019-06-10 RX ORDER — ESTRADIOL 0.5 MG/1
0.5 TABLET ORAL DAILY
Qty: 90 TABLET | Refills: 1 | Status: SHIPPED | OUTPATIENT
Start: 2019-06-10 | End: 2021-10-01

## 2019-06-10 ASSESSMENT — PATIENT HEALTH QUESTIONNAIRE - PHQ9
1. LITTLE INTEREST OR PLEASURE IN DOING THINGS: 0
SUM OF ALL RESPONSES TO PHQ QUESTIONS 1-9: 0
2. FEELING DOWN, DEPRESSED OR HOPELESS: 0
SUM OF ALL RESPONSES TO PHQ QUESTIONS 1-9: 0
SUM OF ALL RESPONSES TO PHQ9 QUESTIONS 1 & 2: 0

## 2019-08-16 ENCOUNTER — OFFICE VISIT (OUTPATIENT)
Dept: FAMILY MEDICINE CLINIC | Age: 49
End: 2019-08-16
Payer: COMMERCIAL

## 2019-08-16 VITALS
HEIGHT: 69 IN | TEMPERATURE: 98.3 F | SYSTOLIC BLOOD PRESSURE: 142 MMHG | WEIGHT: 218 LBS | BODY MASS INDEX: 32.29 KG/M2 | DIASTOLIC BLOOD PRESSURE: 90 MMHG | HEART RATE: 70 BPM | OXYGEN SATURATION: 98 %

## 2019-08-16 DIAGNOSIS — N95.1 MENOPAUSAL SYMPTOMS: ICD-10-CM

## 2019-08-16 DIAGNOSIS — I10 ESSENTIAL HYPERTENSION: Primary | ICD-10-CM

## 2019-08-16 PROCEDURE — 99213 OFFICE O/P EST LOW 20 MIN: CPT | Performed by: FAMILY MEDICINE

## 2019-08-16 RX ORDER — AMLODIPINE BESYLATE 2.5 MG/1
2.5 TABLET ORAL DAILY
Qty: 90 TABLET | Refills: 1 | Status: SHIPPED | OUTPATIENT
Start: 2019-08-16 | End: 2020-03-23 | Stop reason: SDUPTHER

## 2019-11-01 ENCOUNTER — OFFICE VISIT (OUTPATIENT)
Dept: FAMILY MEDICINE CLINIC | Age: 49
End: 2019-11-01
Payer: COMMERCIAL

## 2019-11-01 VITALS
OXYGEN SATURATION: 98 % | BODY MASS INDEX: 32.29 KG/M2 | HEIGHT: 69 IN | SYSTOLIC BLOOD PRESSURE: 132 MMHG | DIASTOLIC BLOOD PRESSURE: 76 MMHG | HEART RATE: 75 BPM | WEIGHT: 218 LBS

## 2019-11-01 DIAGNOSIS — I10 ESSENTIAL HYPERTENSION: Primary | ICD-10-CM

## 2019-11-01 DIAGNOSIS — Z12.11 SCREEN FOR COLON CANCER: ICD-10-CM

## 2019-11-01 DIAGNOSIS — E66.09 CLASS 1 OBESITY DUE TO EXCESS CALORIES WITH SERIOUS COMORBIDITY AND BODY MASS INDEX (BMI) OF 32.0 TO 32.9 IN ADULT: ICD-10-CM

## 2019-11-01 DIAGNOSIS — N95.1 MENOPAUSAL SYMPTOMS: ICD-10-CM

## 2019-11-01 PROCEDURE — 99214 OFFICE O/P EST MOD 30 MIN: CPT | Performed by: FAMILY MEDICINE

## 2019-11-01 RX ORDER — ESTRADIOL 0.05 MG/D
1 PATCH TRANSDERMAL WEEKLY
Qty: 4 PATCH | Refills: 5 | Status: SHIPPED | OUTPATIENT
Start: 2019-11-01 | End: 2021-10-01

## 2019-11-29 ENCOUNTER — TELEPHONE (OUTPATIENT)
Dept: FAMILY MEDICINE CLINIC | Age: 49
End: 2019-11-29

## 2019-11-29 RX ORDER — AMOXICILLIN 500 MG/1
1000 CAPSULE ORAL 2 TIMES DAILY
Qty: 40 CAPSULE | Refills: 0 | Status: SHIPPED | OUTPATIENT
Start: 2019-11-29 | End: 2019-12-09

## 2020-03-03 ENCOUNTER — OFFICE VISIT (OUTPATIENT)
Dept: FAMILY MEDICINE CLINIC | Age: 50
End: 2020-03-03
Payer: COMMERCIAL

## 2020-03-03 VITALS
DIASTOLIC BLOOD PRESSURE: 82 MMHG | WEIGHT: 233 LBS | SYSTOLIC BLOOD PRESSURE: 120 MMHG | HEART RATE: 69 BPM | HEIGHT: 69 IN | RESPIRATION RATE: 16 BRPM | BODY MASS INDEX: 34.51 KG/M2 | OXYGEN SATURATION: 99 %

## 2020-03-03 PROCEDURE — 99213 OFFICE O/P EST LOW 20 MIN: CPT | Performed by: FAMILY MEDICINE

## 2020-03-03 RX ORDER — PREDNISONE 10 MG/1
TABLET ORAL
Qty: 30 TABLET | Refills: 0 | Status: SHIPPED | OUTPATIENT
Start: 2020-03-03 | End: 2020-03-13

## 2020-03-03 ASSESSMENT — PATIENT HEALTH QUESTIONNAIRE - PHQ9
SUM OF ALL RESPONSES TO PHQ QUESTIONS 1-9: 0
1. LITTLE INTEREST OR PLEASURE IN DOING THINGS: 0
SUM OF ALL RESPONSES TO PHQ9 QUESTIONS 1 & 2: 0
2. FEELING DOWN, DEPRESSED OR HOPELESS: 0
SUM OF ALL RESPONSES TO PHQ QUESTIONS 1-9: 0

## 2020-03-23 RX ORDER — AMLODIPINE BESYLATE 2.5 MG/1
2.5 TABLET ORAL DAILY
Qty: 90 TABLET | Refills: 1 | Status: SHIPPED | OUTPATIENT
Start: 2020-03-23 | End: 2020-12-02 | Stop reason: SDUPTHER

## 2020-07-02 ENCOUNTER — NURSE ONLY (OUTPATIENT)
Dept: PRIMARY CARE CLINIC | Age: 50
End: 2020-07-02

## 2020-07-02 NOTE — PROGRESS NOTES
Sharda Cox received a viral test for COVID-19. They were educated on isolation and quarantine as appropriate. For any symptoms, they were directed to seek care from their PCP, given contact information to establish with a doctor, directed to an urgent care or the emergency room.

## 2020-07-05 LAB
SARS-COV-2: NOT DETECTED
SOURCE: NORMAL

## 2020-07-21 ENCOUNTER — OFFICE VISIT (OUTPATIENT)
Dept: FAMILY MEDICINE CLINIC | Age: 50
End: 2020-07-21
Payer: COMMERCIAL

## 2020-07-21 VITALS
DIASTOLIC BLOOD PRESSURE: 94 MMHG | BODY MASS INDEX: 37.18 KG/M2 | OXYGEN SATURATION: 98 % | HEIGHT: 69 IN | TEMPERATURE: 97.7 F | HEART RATE: 78 BPM | WEIGHT: 251 LBS | SYSTOLIC BLOOD PRESSURE: 138 MMHG

## 2020-07-21 PROCEDURE — 99214 OFFICE O/P EST MOD 30 MIN: CPT | Performed by: FAMILY MEDICINE
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
## 2020-07-22 ENCOUNTER — HOSPITAL ENCOUNTER (OUTPATIENT)
Dept: PHYSICAL THERAPY | Age: 50
Setting detail: THERAPIES SERIES
Discharge: HOME | End: 2020-07-22
Payer: COMMERCIAL

## 2020-07-22 NOTE — PROGRESS NOTES
Physical Therapy      Physical Therapy  Cancellation/No-show Note  Patient Name:  Radhika Christian  :  1970   Date:  2020  Cancelled visits to date: 1  No-shows to date: 0    For today's appointment patient:  [x]  Cancelled  []  Rescheduled appointment  []  No-show     Reason given by patient:  []  Patient ill  []  Conflicting appointment  []  No transportation    [x]  Conflict with work  []  No reason given  []  Other:     Comments:      Electronically signed by:  Vida FULTON#[id number]

## 2020-07-27 ENCOUNTER — HOSPITAL ENCOUNTER (OUTPATIENT)
Dept: PHYSICAL THERAPY | Age: 50
Setting detail: THERAPIES SERIES
Discharge: HOME | End: 2020-07-27
Payer: COMMERCIAL

## 2020-07-30 ENCOUNTER — TELEPHONE (OUTPATIENT)
Dept: FAMILY MEDICINE CLINIC | Age: 50
End: 2020-07-30

## 2020-07-30 DIAGNOSIS — S39.012A ACUTE MYOFASCIAL STRAIN OF LUMBAR REGION, INITIAL ENCOUNTER: ICD-10-CM
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
## 2020-07-30 DIAGNOSIS — S29.019A ACUTE THORACIC MYOFASCIAL STRAIN, INITIAL ENCOUNTER: Primary | ICD-10-CM

## 2020-08-09 ENCOUNTER — HOSPITAL ENCOUNTER (OUTPATIENT)
Age: 50
Discharge: HOME | End: 2020-08-09
Payer: OTHER MISCELLANEOUS

## 2020-08-09 ENCOUNTER — HOSPITAL ENCOUNTER (OUTPATIENT)
Dept: GENERAL RADIOLOGY | Age: 50
Discharge: HOME | End: 2020-08-09
Payer: OTHER MISCELLANEOUS

## 2020-08-09 PROCEDURE — 72072 X-RAY EXAM THORAC SPINE 3VWS: CPT

## 2020-08-09 PROCEDURE — 72100 X-RAY EXAM L-S SPINE 2/3 VWS: CPT

## 2020-08-19 ENCOUNTER — HOSPITAL ENCOUNTER (OUTPATIENT)
Dept: PHYSICAL THERAPY | Age: 50
Setting detail: THERAPIES SERIES
Discharge: HOME | End: 2020-08-19
Payer: COMMERCIAL

## 2020-08-19 PROCEDURE — 97161 PT EVAL LOW COMPLEX 20 MIN: CPT

## 2020-08-19 PROCEDURE — 97110 THERAPEUTIC EXERCISES: CPT

## 2020-08-19 PROCEDURE — 97140 MANUAL THERAPY 1/> REGIONS: CPT
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
## 2020-08-27 ENCOUNTER — HOSPITAL ENCOUNTER (OUTPATIENT)
Dept: PHYSICAL THERAPY | Age: 50
Setting detail: THERAPIES SERIES
Discharge: HOME | End: 2020-08-27
Payer: COMMERCIAL

## 2020-08-27 PROCEDURE — 97140 MANUAL THERAPY 1/> REGIONS: CPT

## 2020-08-27 PROCEDURE — 97110 THERAPEUTIC EXERCISES: CPT

## 2020-08-27 NOTE — FLOWSHEET NOTE
understanding of written program    Manual Treatments (40541):  Passive stretching hamstring,hip rotators ,IT band,rectus,hip flexors     Modalities:  US x 8 min low back    Timed Code Treatment Minutes:  16 min manual, 21 min TE    Total Treatment Minutes:  45 min    Treatment/Activity Tolerance:  [] Patient tolerated treatment well [] Patient limited by fatigue  [x] Patient limited by pain  [] Patient limited by other medical complications  [] Other:     Assessment: Pt tolerated all stretches well without increased pain, verbalized understanding of written program provided. Pt will benefit from ongoing PT to progress core strength and mobility to enable her to resume I functional mobility and ambulation without pain. Prognosis: [x] Good [] Fair  [] Poor    Patient Requires Follow-up: [x] Yes  [] No    Goals:  Short term goals  Time Frame for Short term goals: 3 weeks  Short term goal 1: Improve B hip ROM to WNL  Short term goal 2: Increase R hip strength to 3/5 for I functional mobility  Short term goal 3: Decrease pain w/ standing/walking to 6/10  Short term goal 4: Improve LEF Score from 32/80 to 40/80  Short term goal 5: Improve Modified Oswestry Score from 20/40 to 25/40  Long term goals  Time Frame for Long term goals : 6 weeks  Long term goal 1:  Increase strength to 3+/5 B hips/core  Long term goal 2: decrease pain with standing/walking to 3/10  Long term goal 3: Improve LEFscore from 32/80 to 50/80  Long term goal 4: Improve Modified Oswestry score from 20/40 to 30/40  Long term goal 5: Demonstrate I HEP and progression    Plan:   Visits per week:  2x/wk  # of weeks:  6wks    [] Continue per plan of care [] Alter current plan (see comments)  [x] Plan of care initiated [] Hold pending MD visit [] Discharge    Plan for Next Session:  Progress strength,ROM     Electronically signed by:  Beryle Argyle

## 2020-09-01 ENCOUNTER — APPOINTMENT (OUTPATIENT)
Dept: PHYSICAL THERAPY | Age: 50
End: 2020-09-01
Payer: OTHER MISCELLANEOUS

## 2020-09-02 ENCOUNTER — HOSPITAL ENCOUNTER (OUTPATIENT)
Dept: PHYSICAL THERAPY | Age: 50
Setting detail: THERAPIES SERIES
Discharge: HOME | End: 2020-09-02
Payer: OTHER MISCELLANEOUS

## 2020-09-02 PROCEDURE — G0283 ELEC STIM OTHER THAN WOUND: HCPCS

## 2020-09-02 PROCEDURE — 97110 THERAPEUTIC EXERCISES: CPT

## 2020-09-02 PROCEDURE — 97140 MANUAL THERAPY 1/> REGIONS: CPT

## 2020-09-03 ENCOUNTER — APPOINTMENT (OUTPATIENT)
Dept: PHYSICAL THERAPY | Age: 50
End: 2020-09-03
Payer: OTHER MISCELLANEOUS

## 2020-09-09 ENCOUNTER — HOSPITAL ENCOUNTER (OUTPATIENT)
Dept: PHYSICAL THERAPY | Age: 50
Setting detail: THERAPIES SERIES
Discharge: HOME | End: 2020-09-09
Payer: OTHER MISCELLANEOUS

## 2020-09-09 PROCEDURE — G0283 ELEC STIM OTHER THAN WOUND: HCPCS

## 2020-09-09 PROCEDURE — 97110 THERAPEUTIC EXERCISES: CPT
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
## 2020-09-14 ENCOUNTER — HOSPITAL ENCOUNTER (OUTPATIENT)
Dept: PHYSICAL THERAPY | Age: 50
Setting detail: THERAPIES SERIES
Discharge: HOME | End: 2020-09-14
Payer: OTHER MISCELLANEOUS

## 2020-09-16 ENCOUNTER — HOSPITAL ENCOUNTER (OUTPATIENT)
Dept: PHYSICAL THERAPY | Age: 50
Setting detail: THERAPIES SERIES
Discharge: HOME | End: 2020-09-16
Payer: OTHER MISCELLANEOUS

## 2020-09-16 PROCEDURE — G0283 ELEC STIM OTHER THAN WOUND: HCPCS

## 2020-09-16 PROCEDURE — 97140 MANUAL THERAPY 1/> REGIONS: CPT

## 2020-09-16 PROCEDURE — 97110 THERAPEUTIC EXERCISES: CPT
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
## 2020-09-21 ENCOUNTER — HOSPITAL ENCOUNTER (OUTPATIENT)
Dept: PHYSICAL THERAPY | Age: 50
Setting detail: THERAPIES SERIES
Discharge: HOME | End: 2020-09-21
Payer: OTHER MISCELLANEOUS

## 2020-09-21 PROCEDURE — 97110 THERAPEUTIC EXERCISES: CPT

## 2020-09-21 PROCEDURE — G0283 ELEC STIM OTHER THAN WOUND: HCPCS

## 2020-09-21 PROCEDURE — 97140 MANUAL THERAPY 1/> REGIONS: CPT
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
## 2020-09-23 ENCOUNTER — HOSPITAL ENCOUNTER (OUTPATIENT)
Dept: PHYSICAL THERAPY | Age: 50
Setting detail: THERAPIES SERIES
Discharge: HOME | End: 2020-09-23
Payer: OTHER MISCELLANEOUS

## 2020-09-23 PROCEDURE — G0283 ELEC STIM OTHER THAN WOUND: HCPCS

## 2020-09-23 PROCEDURE — 97110 THERAPEUTIC EXERCISES: CPT

## 2020-09-23 PROCEDURE — 97140 MANUAL THERAPY 1/> REGIONS: CPT
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
## 2020-09-28 ENCOUNTER — HOSPITAL ENCOUNTER (OUTPATIENT)
Dept: PHYSICAL THERAPY | Age: 50
Setting detail: THERAPIES SERIES
Discharge: HOME | End: 2020-09-28
Payer: OTHER MISCELLANEOUS

## 2020-09-28 PROCEDURE — G0283 ELEC STIM OTHER THAN WOUND: HCPCS

## 2020-09-28 PROCEDURE — 97140 MANUAL THERAPY 1/> REGIONS: CPT

## 2020-09-28 PROCEDURE — 97110 THERAPEUTIC EXERCISES: CPT
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
## 2020-09-30 ENCOUNTER — HOSPITAL ENCOUNTER (OUTPATIENT)
Dept: PHYSICAL THERAPY | Age: 50
Setting detail: THERAPIES SERIES
Discharge: HOME | End: 2020-09-30
Payer: OTHER MISCELLANEOUS

## 2020-09-30 NOTE — CARE COORDINATION
Physical Therapy  Cancellation/No-show Note  Patient Name:  Ирина Bobo  :  1970   Date:  2020  MRN: [id number]  Cancelled visits to date: 0  No-shows to date: 0    For today's appointment patient:  []  Cancelled  [x]  Rescheduled appointment  []  No-show     Reason given by patient:  []  Patient ill  []  Conflicting appointment  []  No transportation    []  Conflict with work  []  No reason given  [x]  Other:     Comments:  Pt arrived at scheduled appt 20 min late with inability to see pt due to starting eval on next scheduled pt.  Pt will be out of town next week but scheduled visits the following week to continue POC    Electronically signed by:  Lincoln Herbert PT

## 2020-10-12 ENCOUNTER — HOSPITAL ENCOUNTER (OUTPATIENT)
Dept: PHYSICAL THERAPY | Age: 50
Setting detail: THERAPIES SERIES
Discharge: HOME | End: 2020-10-12
Payer: COMMERCIAL

## 2020-10-12 PROCEDURE — G0283 ELEC STIM OTHER THAN WOUND: HCPCS

## 2020-10-12 PROCEDURE — 97110 THERAPEUTIC EXERCISES: CPT
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
## 2020-10-14 ENCOUNTER — HOSPITAL ENCOUNTER (OUTPATIENT)
Dept: PHYSICAL THERAPY | Age: 50
Setting detail: THERAPIES SERIES
Discharge: HOME | End: 2020-10-14
Payer: COMMERCIAL

## 2020-10-14 NOTE — CARE COORDINATION
Physical Therapy  Cancellation/No-show Note  Patient Name:  Karen Koehler  :  1970   Date:  10/14/2020  MRN: [id number]  Cancelled visits to date: 0  No-shows to date: 0    For today's appointment patient:  [x]  Cancelled  []  Rescheduled appointment  []  No-show     Reason given by patient:  []  Patient ill  []  Conflicting appointment  []  No transportation    [x]  Conflict with work  []  No reason given  []  Other:     Comments:      Electronically signed by:  Chelle Edwards PT
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
## 2020-10-21 ENCOUNTER — HOSPITAL ENCOUNTER (OUTPATIENT)
Dept: PHYSICAL THERAPY | Age: 50
Setting detail: THERAPIES SERIES
Discharge: HOME | End: 2020-10-21
Payer: COMMERCIAL

## 2020-10-21 PROCEDURE — G0283 ELEC STIM OTHER THAN WOUND: HCPCS

## 2020-10-21 PROCEDURE — 97110 THERAPEUTIC EXERCISES: CPT
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
## 2020-10-26 ENCOUNTER — HOSPITAL ENCOUNTER (OUTPATIENT)
Dept: PHYSICAL THERAPY | Age: 50
Setting detail: THERAPIES SERIES
Discharge: HOME | End: 2020-10-26
Payer: COMMERCIAL

## 2020-10-26 PROCEDURE — 97110 THERAPEUTIC EXERCISES: CPT

## 2020-10-26 PROCEDURE — G0283 ELEC STIM OTHER THAN WOUND: HCPCS

## 2020-10-26 PROCEDURE — 97140 MANUAL THERAPY 1/> REGIONS: CPT
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
## 2020-10-28 ENCOUNTER — HOSPITAL ENCOUNTER (OUTPATIENT)
Dept: PHYSICAL THERAPY | Age: 50
Setting detail: THERAPIES SERIES
Discharge: HOME | End: 2020-10-28
Payer: COMMERCIAL

## 2020-10-28 PROCEDURE — 97110 THERAPEUTIC EXERCISES: CPT

## 2020-10-28 PROCEDURE — G0283 ELEC STIM OTHER THAN WOUND: HCPCS

## 2020-10-28 PROCEDURE — 97140 MANUAL THERAPY 1/> REGIONS: CPT
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
## 2020-11-02 ENCOUNTER — HOSPITAL ENCOUNTER (OUTPATIENT)
Dept: PHYSICAL THERAPY | Age: 50
Setting detail: THERAPIES SERIES
Discharge: HOME | End: 2020-11-02
Payer: OTHER MISCELLANEOUS

## 2020-11-02 NOTE — CARE COORDINATION
Physical Therapy  Cancellation/No-show Note  Patient Name:  Sandra Kenny  :  1970   Date:  2020  MRN: [id number]  Cancelled visits to date: 0  No-shows to date: 0    For today's appointment patient:  [x]  Cancelled  []  Rescheduled appointment  []  No-show     Reason given by patient:  []  Patient ill  []  Conflicting appointment  []  No transportation    [x]  Conflict with work  []  No reason given  []  Other:     Comments:  Rescheduled for Wednesday    Electronically signed by:  Kinga Smart PT

## 2020-11-04 ENCOUNTER — HOSPITAL ENCOUNTER (OUTPATIENT)
Dept: PHYSICAL THERAPY | Age: 50
Setting detail: THERAPIES SERIES
Discharge: HOME | End: 2020-11-04
Payer: OTHER MISCELLANEOUS

## 2020-11-04 PROCEDURE — 97110 THERAPEUTIC EXERCISES: CPT
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
## 2020-12-02 ENCOUNTER — TELEPHONE (OUTPATIENT)
Dept: FAMILY MEDICINE CLINIC | Age: 50
End: 2020-12-02

## 2020-12-02 RX ORDER — AMLODIPINE BESYLATE 2.5 MG/1
2.5 TABLET ORAL DAILY
Qty: 90 TABLET | Refills: 0 | Status: SHIPPED | OUTPATIENT
Start: 2020-12-02 | End: 2021-03-31

## 2020-12-02 NOTE — TELEPHONE ENCOUNTER
Due for f/u now for HTN. BP not to goal at last visit. bp med refilled  OK to schedule at [facility] for sinusitis, or virtual visit with another provider. Even with Covid testing at work, we don't do acute illness visits here (as a general rule). Thanks.

## 2020-12-02 NOTE — TELEPHONE ENCOUNTER
Last seen in office by you on 7/21/20  Needs refill on BP med. Also wanting med for sinus infection.   Please Advise

## 2020-12-02 NOTE — TELEPHONE ENCOUNTER
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 Khoi Redman advises       Also attached medication due to pt being out of this med

## 2020-12-02 NOTE — TELEPHONE ENCOUNTER
JEYSONM for her to call back for a virtual appt with someone or an appt at the [facility].

## 2020-12-03 ENCOUNTER — TELEMEDICINE (OUTPATIENT)
Dept: FAMILY MEDICINE CLINIC | Age: 50
End: 2020-12-03
Payer: COMMERCIAL

## 2020-12-03 PROCEDURE — 99213 OFFICE O/P EST LOW 20 MIN: CPT | Performed by: NURSE PRACTITIONER

## 2020-12-03 NOTE — LETTER
[address] [facility] [address]  Phone: [phone]  Fax: [phone]    LANEY Kang CNP        December 3, 2020     Patient: Karen Koehler   YOB: 1970   Date of Visit: 12/3/2020       To Whom it May Concern:    Karen Koehler was seen virtually on 12/3/2020. She may return to work on 12/7/20 as long as symptoms have improved and her COVID-19 testing is negative. If you have any questions or concerns, please don't hesitate to call.     Sincerely,     LANEY Kang CNP

## 2020-12-04 ASSESSMENT — ENCOUNTER SYMPTOMS
COUGH: 0
SINUS PAIN: 0
DIARRHEA: 0
NAUSEA: 0
SINUS PRESSURE: 0
SORE THROAT: 0
ABDOMINAL PAIN: 0
SHORTNESS OF BREATH: 0
RHINORRHEA: 0
VOMITING: 0

## 2020-12-10 ENCOUNTER — TELEPHONE (OUTPATIENT)
Dept: FAMILY MEDICINE CLINIC | Age: 50
End: 2020-12-10

## 2020-12-10 NOTE — TELEPHONE ENCOUNTER
Patient called in because she needs a flu shot for work however she does not want to get it for she doesn't want to be injected with anything. Patient has never gotten the flu shot before so she would like to know if she can get a waiver from Dr. Carlene Vo. Please call patient either way if Dr. Carlene Vo will send or will not send a letter. If so please fax it to [phone].     Please advise

## 2020-12-10 NOTE — TELEPHONE ENCOUNTER
She has no medical contraindication to influenza vaccination. While I respect her choice for herself, preferring not to get it is not a medical reason. Sorry.

## 2020-12-21 ENCOUNTER — TELEPHONE (OUTPATIENT)
Dept: FAMILY MEDICINE CLINIC | Age: 50
End: 2020-12-21

## 2020-12-21 ENCOUNTER — NURSE TRIAGE (OUTPATIENT)
Dept: OTHER | Facility: CLINIC | Age: 50
End: 2020-12-21

## 2020-12-21 NOTE — TELEPHONE ENCOUNTER
Yes that is fine.    If her sxs become severe or she has extreme stomach pains then she should go to ER

## 2020-12-21 NOTE — TELEPHONE ENCOUNTER
Patient called Vania Moy at the pre-service center [facility] with red flag complaint. Brief description of triage: Patient calling with concerns for continuing nasal drainage. Triage indicates for patient to see today or tomorrow in office. Care advice provided, patient verbalizes understanding; denies any other questions or concerns; instructed to call back for any new or worsening symptoms. Writer provided warm transfer to Debora at [facility] for appointment scheduling. Attention Provider: Thank you for allowing me to participate in the care of your patient. The patient was connected to triage in response to information provided to the [facility]. Please do not respond through this encounter as the response is not directed to a shared pool. Reason for Disposition   Nasal discharge present > 10 days    Answer Assessment - Initial Assessment Questions  1. ONSET: \"When did the nasal discharge start? \"       A couple weeks ago    2. AMOUNT: \"How much discharge is there? \"       A good amount:  Yellowish to clear with bad taste    3. COUGH: \"Do you have a cough? \" If yes, ask: \"Describe the color of your sputum\" (clear, white, yellow, green)      No     4. RESPIRATORY DISTRESS: \"Describe your breathing. \"       Denies    5. FEVER: \"Do you have a fever? \" If so, ask: \"What is your temperature, how was it measured, and when did it start? \"      No    6. SEVERITY: \"Overall, how bad are you feeling right now? \" (e.g., doesn't interfere with normal activities, staying home from school/work, staying in bed)       Not bad    7. OTHER SYMPTOMS: \"Do you have any other symptoms? \" (e.g., sore throat, earache, wheezing, vomiting)      Nausea and diarrhea    8. PREGNANCY: \"Is there any chance you are pregnant? \" \"When was your last menstrual period? \"      No    Protocols used: COMMON COLD-ADULT-[place]
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
## 2021-02-22 ENCOUNTER — OFFICE VISIT (OUTPATIENT)
Dept: FAMILY MEDICINE CLINIC | Age: 51
End: 2021-02-22
Payer: COMMERCIAL

## 2021-02-22 VITALS
TEMPERATURE: 97.9 F | OXYGEN SATURATION: 98 % | SYSTOLIC BLOOD PRESSURE: 124 MMHG | BODY MASS INDEX: 38.21 KG/M2 | WEIGHT: 258 LBS | HEIGHT: 69 IN | DIASTOLIC BLOOD PRESSURE: 86 MMHG

## 2021-02-22 DIAGNOSIS — K21.9 GASTROESOPHAGEAL REFLUX DISEASE WITHOUT ESOPHAGITIS: ICD-10-CM

## 2021-02-22 DIAGNOSIS — Z12.11 COLON CANCER SCREENING: ICD-10-CM

## 2021-02-22 DIAGNOSIS — J30.89 NON-SEASONAL ALLERGIC RHINITIS, UNSPECIFIED TRIGGER: ICD-10-CM

## 2021-02-22 DIAGNOSIS — E66.09 CLASS 1 OBESITY DUE TO EXCESS CALORIES WITH SERIOUS COMORBIDITY AND BODY MASS INDEX (BMI) OF 32.0 TO 32.9 IN ADULT: ICD-10-CM

## 2021-02-22 DIAGNOSIS — R53.83 FATIGUE, UNSPECIFIED TYPE: ICD-10-CM

## 2021-02-22 DIAGNOSIS — I10 ESSENTIAL HYPERTENSION: Primary | ICD-10-CM

## 2021-02-22 DIAGNOSIS — Z12.31 SCREENING MAMMOGRAM, ENCOUNTER FOR: ICD-10-CM

## 2021-02-22 LAB
A/G RATIO: 1.3 (ref 1.1–2.2)
ALBUMIN SERPL-MCNC: 4.3 G/DL (ref 3.4–5)
ALP BLD-CCNC: 161 U/L (ref 40–129)
ALT SERPL-CCNC: 23 U/L (ref 10–40)
ANION GAP SERPL CALCULATED.3IONS-SCNC: 14 MMOL/L (ref 3–16)
AST SERPL-CCNC: 24 U/L (ref 15–37)
BASOPHILS ABSOLUTE: 0 K/UL (ref 0–0.2)
BASOPHILS RELATIVE PERCENT: 0.4 %
BILIRUB SERPL-MCNC: <0.2 MG/DL (ref 0–1)
BUN BLDV-MCNC: 12 MG/DL (ref 7–20)
CALCIUM SERPL-MCNC: 10.1 MG/DL (ref 8.3–10.6)
CHLORIDE BLD-SCNC: 107 MMOL/L (ref 99–110)
CHOLESTEROL, TOTAL: 172 MG/DL (ref 0–199)
CO2: 22 MMOL/L (ref 21–32)
CREAT SERPL-MCNC: 0.8 MG/DL (ref 0.6–1.1)
EOSINOPHILS ABSOLUTE: 0.1 K/UL (ref 0–0.6)
EOSINOPHILS RELATIVE PERCENT: 1.4 %
GFR AFRICAN AMERICAN: >60
GFR NON-AFRICAN AMERICAN: >60
GLOBULIN: 3.2 G/DL
GLUCOSE BLD-MCNC: 101 MG/DL (ref 70–99)
HCT VFR BLD CALC: 41.6 % (ref 36–48)
HDLC SERPL-MCNC: 64 MG/DL (ref 40–60)
HEMOGLOBIN: 13.6 G/DL (ref 12–16)
LDL CHOLESTEROL CALCULATED: 94 MG/DL
LYMPHOCYTES ABSOLUTE: 1.3 K/UL (ref 1–5.1)
LYMPHOCYTES RELATIVE PERCENT: 22.8 %
MCH RBC QN AUTO: 28.7 PG (ref 26–34)
MCHC RBC AUTO-ENTMCNC: 32.6 G/DL (ref 31–36)
MCV RBC AUTO: 88.1 FL (ref 80–100)
MONOCYTES ABSOLUTE: 0.4 K/UL (ref 0–1.3)
MONOCYTES RELATIVE PERCENT: 6.5 %
NEUTROPHILS ABSOLUTE: 4 K/UL (ref 1.7–7.7)
NEUTROPHILS RELATIVE PERCENT: 68.9 %
PDW BLD-RTO: 13.5 % (ref 12.4–15.4)
PLATELET # BLD: 145 K/UL (ref 135–450)
PMV BLD AUTO: 12.8 FL (ref 5–10.5)
POTASSIUM SERPL-SCNC: 4.6 MMOL/L (ref 3.5–5.1)
RBC # BLD: 4.72 M/UL (ref 4–5.2)
SODIUM BLD-SCNC: 143 MMOL/L (ref 136–145)
TOTAL PROTEIN: 7.5 G/DL (ref 6.4–8.2)
TRIGL SERPL-MCNC: 71 MG/DL (ref 0–150)
TSH REFLEX FT4: 1.09 UIU/ML (ref 0.27–4.2)
VLDLC SERPL CALC-MCNC: 14 MG/DL
WBC # BLD: 5.8 K/UL (ref 4–11)

## 2021-02-22 PROCEDURE — 99214 OFFICE O/P EST MOD 30 MIN: CPT | Performed by: FAMILY MEDICINE

## 2021-02-22 RX ORDER — FEXOFENADINE HCL 180 MG/1
180 TABLET ORAL DAILY
Qty: 90 TABLET | Refills: 1 | Status: SHIPPED | OUTPATIENT
Start: 2021-02-22

## 2021-02-22 RX ORDER — AMOXICILLIN 500 MG/1
1000 CAPSULE ORAL 2 TIMES DAILY
Qty: 40 CAPSULE | Refills: 0 | Status: SHIPPED | OUTPATIENT
Start: 2021-02-22 | End: 2021-03-04

## 2021-02-22 RX ORDER — PANTOPRAZOLE SODIUM 20 MG/1
20 TABLET, DELAYED RELEASE ORAL
Qty: 30 TABLET | Refills: 2 | Status: SHIPPED | OUTPATIENT
Start: 2021-02-22 | End: 2022-01-06

## 2021-02-22 RX ORDER — FLUTICASONE PROPIONATE 50 MCG
2 SPRAY, SUSPENSION (ML) NASAL DAILY
Qty: 3 BOTTLE | Refills: 1 | Status: SHIPPED | OUTPATIENT
Start: 2021-02-22

## 2021-02-22 ASSESSMENT — PATIENT HEALTH QUESTIONNAIRE - PHQ9
1. LITTLE INTEREST OR PLEASURE IN DOING THINGS: 0
SUM OF ALL RESPONSES TO PHQ9 QUESTIONS 1 & 2: 0
SUM OF ALL RESPONSES TO PHQ QUESTIONS 1-9: 0

## 2021-02-22 NOTE — PATIENT INSTRUCTIONS
Patient Education        Food as Fuel: Care Instructions  Your Care Instructions     A healthy, balanced diet gives your body nutrients. Nutrients are like fuel for your body. They give you energy. And they keep your heart beating, your brain active, and your muscles working. They also help to build and strengthen bones, muscles, and other body tissues. Your body needs three major nutrients for energy. These are carbohydrate, protein, and fat. · Carbohydrate provides energy for your brain, muscles, heart, and lungs. It is found in bread, cereal, rice, pasta, fruits, vegetables, milk, yogurt, and sugar. · Protein provides energy and helps build and repair your body's cells. It is found in meat, poultry, fish, cooked dry beans, cheese, tofu and other soy products, nuts and seeds, and milk and milk products. · Fat provides energy, helps build the covering around nerves in your body, and helps make hormones. Fat is found in butter, margarine, oil, mayonnaise, salad dressing, and nuts. It is also found in most foods that come from animals, such as meat and milk products. Many foods also have fat added to them. Your body needs all three of these nutrients to be healthy. If you choose a good mix of foods, you can help your body get the right amounts of carbohydrate, protein, and fat. It can also keep you at a healthy weight. Follow-up care is a key part of your treatment and safety. Be sure to make and go to all appointments, and call your doctor if you are having problems. It's also a good idea to know your test results and keep a list of the medicines you take. How can you care for yourself at home? Eat a balanced diet  · Try to eat variety of healthy foods every day. These include:  ? 5 to 8 ounces of bread, cereal, crackers, rice, or pasta. An ounce is about 1 slice of bread, ¾ cup of breakfast cereal, or ½ cup of cooked rice, cereal, or pasta.  Choose whole-grain products for at least half of your grain servings. ? 2 to 3 cups of vegetables. Be sure to include:  § Dark green vegetables such as broccoli and spinach. § Orange vegetables such as carrots and sweet potatoes. ? 1½ to 2 cups of fresh, frozen, or canned fruit. A banana, an orange, or a large apple equals 1 cup. ? 3 cups of nonfat or low-fat milk, yogurt, or other milk products. ? 5 to 6½ ounces of protein foods. These include chicken, fish, lean meat, beans, nuts, and seeds. One egg equals 1 ounce of meat, poultry, or fish. A ½ cup of cooked beans equals 2 ounces of protein. Stay fueled all day  · Start your day with breakfast. If you don't have time to sit down for a bowl of cereal in the morning, try something that you can eat \"on the go. \" Try a piece of whole wheat bread with peanut butter or a container of yogurt with frozen berries mixed in. · Eat regularly scheduled meals and snacks. If you miss a meal, you may overeat at the next meal. Or you may choose a less healthy snack. · Drink enough water. (If you have kidney, heart, or liver disease and have to limit fluids, talk with your doctor before you increase your fluid intake.)  Where can you learn more? Go to https://[employer]. org and sign in to your [employer] account. Enter H661 in the [facility] box to learn more about \"Food as Fuel: Care Instructions. \"     If you do not have an account, please click on the \"Sign Up Now\" link. Current as of: August 22, 2019               Content Version: 12.6  © 8498-3099 [employer], [employer]. Care instructions adapted under license by [facility] [employer] [facility] ([facility]). If you have questions about a medical condition or this instruction, always ask your healthcare professional. [address]  any warranty or liability for your use of this information.

## 2021-02-22 NOTE — PROGRESS NOTES
2021    Blood pressure (!) 144/96, temperature 97.9 °F (36.6 °C), temperature source Temporal, height 5' 8.5\" (1.74 m), weight 258 lb (117 kg), SpO2 98 %, not currently breastfeeding. Otoniel Greenwood (:  1970) is a 45 y.o. female, here for evaluation of the following medical concerns:    Chief Complaint   Patient presents with    Hypertension     f/u htn     - Works as a  at [address] [place] [address], very busy with [address] [place] [address] in [address] [place] [address] at [facility] in December, dx with GERD - Prilosec, took before breakfast everyday for a month but had issues with nausea on medication  - Coughing up phlegm and it has a bitter taste, phlegm normal but bitter taste is normal  - Has had phlegm for about 20 years, all day long, after blowing nose - blows multiple times a day  - Does not take any daily allergy medication    HTN  - Blood pressure stable today  - On Norvasc 2.5mg, no side effects - no lightheadedness/dizziness  - Does not check BP at home or at work    Obesity  -  \"Heaviest I've ever been\", has had issues and difficulties since hysterectomy/early menopause  - Used to go to gym, was going to [employer] and has not gone back since [facility]  - Would like to see nutritionist just to be accountable  - Always tired, unmotivated  - Sees neighbor walking, would like to do the same thing. Fatigue  - Feels always tired since hysterectomy   - Significant weight gain  - Sleep habits \"not the best\" but work in progress    Patient Active Problem List   Diagnosis    Essential hypertension    H/O colonoscopy  (for IBS). benign polyps removed.  Sprain of anterior talofibular ligament of left ankle    Menopausal symptoms    Class 1 obesity due to excess calories with serious comorbidity and body mass index (BMI) of 32.0 to 32.9 in adult    Irritable bowel syndrome with diarrhea        Body mass index is 38.66 kg/m².     Wt Readings from Last 3 Encounters:   21 258 lb (117 kg)   12/23/20 253 lb (114.8 kg)   07/21/20 251 lb (113.9 kg)       BP Readings from Last 3 Encounters:   02/22/21 (!) 144/96   12/23/20 136/84   07/21/20 (!) 138/94       Allergies   Allergen Reactions    Oxycodone-Acetaminophen Itching       Prior to Visit Medications    Medication Sig Taking? Authorizing Provider   amLODIPine (NORVASC) 2.5 MG tablet Take 1 tablet by mouth daily Yes Margaret Burciaga MD   omeprazole (PRILOSEC) 20 MG delayed release capsule Take 1 capsule by mouth every morning (before breakfast)  Patient not taking: Reported on 2/22/2021  LANEY Alaniz - CNP   estradiol (CLIMARA) 0.05 MG/24HR Place 1 patch onto the skin once a week  Patient not taking: Reported on 2/22/2021  Margaret Burciaga MD   estradiol (ESTRACE) 0.5 MG tablet Take 1 tablet by mouth daily  Patient not taking: Reported on 2/22/2021  Margaret Burciaga MD        Social History     Tobacco Use    Smoking status: Never Smoker    Smokeless tobacco: Never Used   Substance Use Topics    Alcohol use: Yes     Alcohol/week: 0.0 standard drinks     Comment: socially    Drug use: No       Review of Systems As above otherwise negative    Physical Exam  Constitutional:       General: She is not in acute distress. Appearance: Normal appearance. She is not ill-appearing, toxic-appearing or diaphoretic. HENT:      Head: Normocephalic and atraumatic. Nose: No congestion or rhinorrhea. Eyes:      General: No scleral icterus. Right eye: No discharge. Left eye: No discharge. Neck:      Musculoskeletal: Normal range of motion and neck supple. Cardiovascular:      Rate and Rhythm: Normal rate. Heart sounds: Normal heart sounds. Pulmonary:      Effort: Pulmonary effort is normal. No respiratory distress. Breath sounds: Normal breath sounds. No stridor. No wheezing, rhonchi or rales. Chest:      Chest wall: No tenderness. Skin:     General: Skin is warm and dry. AM

## 2021-02-23 DIAGNOSIS — D69.1 ABNORMAL PLATELETS (HCC): Primary | ICD-10-CM

## 2021-02-23 LAB
ESTIMATED AVERAGE GLUCOSE: 119.8 MG/DL
HBA1C MFR BLD: 5.8 %

## 2021-03-09 PROBLEM — D69.3 IDIOPATHIC THROMBOCYTOPENIC PURPURA (HCC): Status: ACTIVE | Noted: 2021-03-09

## 2021-06-29 ENCOUNTER — HOSPITAL ENCOUNTER (EMERGENCY)
Age: 51
Discharge: HOME | End: 2021-06-29
Attending: EMERGENCY MEDICINE
Payer: COMMERCIAL

## 2021-06-29 ENCOUNTER — APPOINTMENT (OUTPATIENT)
Dept: GENERAL RADIOLOGY | Age: 51
End: 2021-06-29
Payer: COMMERCIAL

## 2021-06-29 VITALS
HEART RATE: 80 BPM | HEIGHT: 68 IN | OXYGEN SATURATION: 99 % | RESPIRATION RATE: 16 BRPM | TEMPERATURE: 98.6 F | DIASTOLIC BLOOD PRESSURE: 81 MMHG | BODY MASS INDEX: 39.19 KG/M2 | SYSTOLIC BLOOD PRESSURE: 178 MMHG | WEIGHT: 258.6 LBS

## 2021-06-29 DIAGNOSIS — R07.89 ATYPICAL CHEST PAIN: ICD-10-CM

## 2021-06-29 DIAGNOSIS — R07.89 CHEST WALL PAIN: Primary | ICD-10-CM

## 2021-06-29 LAB
ANION GAP SERPL CALCULATED.3IONS-SCNC: 10 MMOL/L (ref 3–16)
BASOPHILS ABSOLUTE: 0 K/UL (ref 0–0.2)
BASOPHILS RELATIVE PERCENT: 0.3 %
BUN BLDV-MCNC: 11 MG/DL (ref 7–20)
CALCIUM SERPL-MCNC: 9.3 MG/DL (ref 8.3–10.6)
CHLORIDE BLD-SCNC: 105 MMOL/L (ref 99–110)
CO2: 24 MMOL/L (ref 21–32)
CREAT SERPL-MCNC: 0.8 MG/DL (ref 0.6–1.1)
EKG ATRIAL RATE: 73 BPM
EKG DIAGNOSIS: NORMAL
EKG P AXIS: 45 DEGREES
EKG P-R INTERVAL: 138 MS
EKG Q-T INTERVAL: 390 MS
EKG QRS DURATION: 86 MS
EKG QTC CALCULATION (BAZETT): 429 MS
EKG R AXIS: -24 DEGREES
EKG T AXIS: 9 DEGREES
EKG VENTRICULAR RATE: 73 BPM
EOSINOPHILS ABSOLUTE: 0 K/UL (ref 0–0.6)
EOSINOPHILS RELATIVE PERCENT: 0.5 %
GFR AFRICAN AMERICAN: >60
GFR NON-AFRICAN AMERICAN: >60
GLUCOSE BLD-MCNC: 103 MG/DL (ref 70–99)
HCT VFR BLD CALC: 39.2 % (ref 36–48)
HEMOGLOBIN: 13.4 G/DL (ref 12–16)
LYMPHOCYTES ABSOLUTE: 1.6 K/UL (ref 1–5.1)
LYMPHOCYTES RELATIVE PERCENT: 26.7 %
MAGNESIUM: 1.8 MG/DL (ref 1.8–2.4)
MCH RBC QN AUTO: 29.1 PG (ref 26–34)
MCHC RBC AUTO-ENTMCNC: 34.1 G/DL (ref 31–36)
MCV RBC AUTO: 85.3 FL (ref 80–100)
MONOCYTES ABSOLUTE: 0.5 K/UL (ref 0–1.3)
MONOCYTES RELATIVE PERCENT: 8.4 %
NEUTROPHILS ABSOLUTE: 3.9 K/UL (ref 1.7–7.7)
NEUTROPHILS RELATIVE PERCENT: 64.1 %
PDW BLD-RTO: 13.7 % (ref 12.4–15.4)
PLATELET # BLD: 137 K/UL (ref 135–450)
PLATELET SLIDE REVIEW: ADEQUATE
PMV BLD AUTO: 11.3 FL (ref 5–10.5)
POTASSIUM REFLEX MAGNESIUM: 3.5 MMOL/L (ref 3.5–5.1)
RBC # BLD: 4.59 M/UL (ref 4–5.2)
SLIDE REVIEW: ABNORMAL
SODIUM BLD-SCNC: 139 MMOL/L (ref 136–145)
TROPONIN: <0.01 NG/ML
WBC # BLD: 6.1 K/UL (ref 4–11)

## 2021-06-29 PROCEDURE — 80048 BASIC METABOLIC PNL TOTAL CA: CPT

## 2021-06-29 PROCEDURE — 93010 ELECTROCARDIOGRAM REPORT: CPT | Performed by: INTERNAL MEDICINE

## 2021-06-29 PROCEDURE — 71046 X-RAY EXAM CHEST 2 VIEWS: CPT

## 2021-06-29 PROCEDURE — 84484 ASSAY OF TROPONIN QUANT: CPT

## 2021-06-29 PROCEDURE — 85025 COMPLETE CBC W/AUTO DIFF WBC: CPT

## 2021-06-29 PROCEDURE — 93005 ELECTROCARDIOGRAM TRACING: CPT | Performed by: EMERGENCY MEDICINE

## 2021-06-29 PROCEDURE — 83735 ASSAY OF MAGNESIUM: CPT

## 2021-06-29 PROCEDURE — 99283 EMERGENCY DEPT VISIT LOW MDM: CPT

## 2021-06-29 ASSESSMENT — PAIN DESCRIPTION - ORIENTATION: ORIENTATION: LEFT

## 2021-06-29 ASSESSMENT — PAIN SCALES - GENERAL: PAINLEVEL_OUTOF10: 6

## 2021-06-29 ASSESSMENT — PAIN DESCRIPTION - LOCATION: LOCATION: CHEST

## 2021-06-29 ASSESSMENT — PAIN DESCRIPTION - DESCRIPTORS: DESCRIPTORS: STABBING

## 2021-06-29 NOTE — ED PROVIDER NOTES
eMERGENCY dEPARTMENT eNCOUnter      Pt Name: Brien Mccloud  MRN: [id number]  [place] 1970  Date of evaluation: 6/29/2021  Provider: Angela Turk MD     [address] [place] [address]       Chief Complaint   Patient presents with    Chest Pain     left-sided, started last night, pain has stayed the same, no cardiac hx         HISTORY OF PRESENT ILLNESS   (Location/Symptom, Timing/Onset,Context/Setting, Quality, Duration, Modifying Factors, Severity) Note limiting factors. HPI    Brien Mccloud is a 45 y.o. female who presents to the emergency department with left-sided chest pain since yesterday. Patient has no cardiac history. Patient does have hypertension on amlodipine. Patient denies any stress. Patient's pain is somewhat reproducible. It has been constant all night and day. It is still present. Patient's blood pressure is elevated on arrival.  There has been no shortness of breath. Patient denies a cough. Patient denies any blunt trauma. Nursing Notes were reviewed. REVIEW OFSYSTEMS    (2+ for level 4; 10+ for level 5)   Review of Systems    General: No fevers, chills or night sweats, No weight loss    Head:  No Sore throat,  No Ear Pain    Chest:  Nontender. No Cough, No SOB,  Chest Pain is positive    GI: No abdominal pain or vomiting    : No dysuria or hematuria    Musculoskeletal: No unrelenting pain or night pain    Neurologic: No bowel or bladder incontinence, No saddle anesthesia, No leg weakness    All other systems reviewed and are negative.         PAST MEDICAL HISTORY     Past Medical History:   Diagnosis Date    HTN (hypertension)     IBS (irritable bowel syndrome)     Lactose intolerance        SURGICAL HISTORY       Past Surgical History:   Procedure Laterality Date    CYST REMOVAL      LADAN AND BSO  10/27/2016    with lysis of adhesions; Dr Zora Fitzgerald       Previous Medications    AMLODIPINE (NORVASC) 2.5 MG TABLET    TAKE ONE TABLET BY MOUTH DAILY Social Connections:     Frequency of Communication with Friends and Family:     Frequency of Social Gatherings with Friends and Family:     Attends Zoroastrian Services:     Active Member of Clubs or Organizations:     Attends Club or Organization Meetings:     Marital Status:    Intimate Partner Violence:     Fear of Current or Ex-Partner:     Emotionally Abused:     Physically Abused:     Sexually Abused:        SCREENINGS           PHYSICAL EXAM    (up to 7 for level 4, 8 or more for level 5)     ED Triage Vitals   BP Temp Temp src Pulse Resp SpO2 Height Weight   -- -- -- -- -- -- -- --       Physical Exam    General: Alert and awake ×3. Nontoxic appearance. Well-developed well-nourished obese black female in no distress. Patient is talking in full sentences. Appears to be not anxious. HEENT: Normocephalic atraumatic. Neck is supple. Airway intact. No adenopathy  Cardiac: Regular rate and rhythm with no murmurs rubs or gallops. There was reproducible chest wall tenderness. No crepitus. Pulmonary: Lungs are clear in all lung fields. No wheezing. No Rales. Abdomen: Soft and nontender. Negative hepatosplenomegaly. Bowel sounds are active  Extremities: Moving all extremities. No calf tenderness. Peripheral pulses all intact  Skin: No skin lesions. No rashes  Neurologic: Cranial nerves II through XII was grossly intact. Nonfocal neurological exam  Psychiatric: Patient is pleasant. Mood is appropriate. DIAGNOSTIC RESULTS     EKG (Per Emergency Physician):     Normal sinus rhythm ventricular 73 minimal voltage otherwise normal EKG    RADIOLOGY (Per Emergency Physician): Interpretation per the Radiologist below, if available at the time of this note:  XR CHEST (2 VW)    Result Date: 6/29/2021  EXAMINATION: TWO XRAY VIEWS OF THE CHEST 6/29/2021 3:03 pm COMPARISON: None.  HISTORY: ORDERING SYSTEM PROVIDED HISTORY: CP TECHNOLOGIST PROVIDED HISTORY: Reason for exam:->CP Reason for Exam: Chest Pain (left-sided, started last night, pain has stayed the same, no cardiac hx) FINDINGS: HEART/MEDIASTINUM: The cardiomediastinal silhouette is within normal limits. PLEURA/LUNGS: There are no focal consolidations or pleural effusions. There is no appreciable pneumothorax. BONES/SOFT TISSUE: No acute abnormality. No radiographic evidence of acute pulmonary disease. ED BEDSIDE ULTRASOUND:   Performed by ED Physician - none    LABS:  Labs Reviewed   BASIC METABOLIC PANEL W/ REFLEX TO MG FOR LOW K - Abnormal; Notable for the following components:       Result Value    Glucose 103 (*)     All other components within normal limits    Narrative:     Performed at:  [facility]  [address], [place]   Phone [phone]   CBC WITH AUTO DIFFERENTIAL    Narrative:     Performed at:  [facility]  [address], [address] [place] [address]   Phone [phone]   TROPONIN    Narrative:     Performed at:  [facility]  [address], [address] [place] [address]   Phone [phone]   MAGNESIUM    Narrative:     Performed at:  [facility]  [address], [facility] [place] [address]   Phone [phone]        All other labs were within normal range or not returned as of this dictation. Procedures      EMERGENCY DEPARTMENT COURSE and DIFFERENTIAL DIAGNOSIS/MDM:   Vitals:    Vitals:    06/29/21 1432 06/29/21 1445 06/29/21 1500 06/29/21 1515   BP:       Pulse:  80 86 81   Resp:  23 26 15   Temp:       TempSrc:       SpO2:  100% 100% 99%   Weight: 258 lb 9.6 oz (117.3 kg)      Height: 5' 8\" (1.727 m)          Medications - No data to display    MDM. Patient is a 74-year-old female with no prior history of heart disease but has some risk factors presents with some chest pain for a day. Started last night and still constant. It is reproducible.  She has a low cardiac score. Low risk factors negative troponin can safely say that this is not cardiac. Patient reassured could be reflux versus muscle skeletal. Placed on just NSAIDs aspirin at this time. Patient reassured follow-up with family physician discharged in good condition    REVAL:         CRITICAL CARE TIME   Total CriticalCare time was 0 minutes, excluding separately reportable procedures. There was a high probability of clinically significant/life threatening deterioration in the patient's condition which required my urgent intervention. CONSULTS:  None    PROCEDURES:  Unless otherwise noted below, none     [unfilled]    FINAL IMPRESSION      1. Chest wall pain    2. Atypical chest pain          DISPOSITION/PLAN   DISPOSITION        PATIENT REFERRED TO:  Misty Ricardo MD  [address] [place] [address]  [phone]    Schedule an appointment as soon as possible for a visit in 1 week  If symptoms worsen      DISCHARGE MEDICATIONS:  New Prescriptions    No medications on file          (Please note:  Portions of this note were completed with a voice recognition program.Efforts were made to edit the dictations but occasionally words and phrases are mis-transcribed.)  Form v2016. J.[id number]    Derrell BONE MD (electronically signed)  Emergency Medicine Provider        Jurgen Mae MD  06/29/21 1142

## 2021-06-30 ENCOUNTER — OFFICE VISIT (OUTPATIENT)
Dept: ORTHOPEDIC SURGERY | Age: 51
End: 2021-06-30
Payer: COMMERCIAL

## 2021-06-30 VITALS — HEIGHT: 68 IN | WEIGHT: 258 LBS | BODY MASS INDEX: 39.1 KG/M2 | RESPIRATION RATE: 16 BRPM

## 2021-06-30 DIAGNOSIS — M72.2 PLANTAR FASCIITIS, LEFT: Primary | ICD-10-CM

## 2021-06-30 DIAGNOSIS — M79.672 LEFT FOOT PAIN: ICD-10-CM

## 2021-06-30 PROCEDURE — 99203 OFFICE O/P NEW LOW 30 MIN: CPT | Performed by: ORTHOPAEDIC SURGERY

## 2021-06-30 RX ORDER — NAPROXEN 500 MG/1
500 TABLET ORAL 2 TIMES DAILY WITH MEALS
Qty: 60 TABLET | Refills: 0 | Status: SHIPPED
Start: 2021-06-30 | End: 2021-12-17

## 2021-06-30 NOTE — PROGRESS NOTES
CHIEF COMPLAINT: Left heel pain/plantar fasciitis. HISTORY:  Ms. Skinny Torres 45 y.o.  female presents today for the first visit for evaluation of left heel pain which started 1 month ago.  She is complaining of achy, sharp, stabbing pain. She rates her pain a 10/10 VAS whenever she is putting weight on her heel. Denies pain with rest.  Pain is increase with standing and wallking. Pain is sharp early in the morning with first few steps, dull achy pain by the end of the day. No radiation and no numbness and tingling sensation. No other complaint. She has not had any treatment for this problem. She is an  in a nursing home, sitting job. Denies smoking. Past Medical History:   Diagnosis Date    HTN (hypertension)     IBS (irritable bowel syndrome)     Lactose intolerance        Past Surgical History:   Procedure Laterality Date    CYST REMOVAL      LADAN AND BSO  10/27/2016    with lysis of adhesions; Dr Eddie Ford       Social History     Socioeconomic History    Marital status: Single     Spouse name: Not on file    Number of children: 0    Years of education: Not on file    Highest education level: Not on file   Occupational History    Occupation: call      Comment: [employer]   Tobacco Use    Smoking status: Never Smoker    Smokeless tobacco: Never Used   Substance and Sexual Activity    Alcohol use:  Yes     Alcohol/week: 0.0 standard drinks     Comment: socially    Drug use: No    Sexual activity: Yes     Partners: Male   Other Topics Concern    Not on file   Social History Narrative    Not on file     Social Determinants of Health     Financial Resource Strain: Low Risk     Difficulty of Paying Living Expenses: Not hard at all   Food Insecurity: No Food Insecurity    Worried About Running Out of Food in the Last Year: Never true    Sara of Food in the Last Year: Never true   Transportation Needs: No Transportation Needs    Lack of Transportation (Medical): No    Lack of Transportation (Non-Medical): No   Physical Activity:     Days of Exercise per Week:     Minutes of Exercise per Session:    Stress:     Feeling of Stress :    Social Connections:     Frequency of Communication with Friends and Family:     Frequency of Social Gatherings with Friends and Family:     Attends Holiness Services:     Active Member of Clubs or Organizations:     Attends Club or Organization Meetings:     Marital Status:    Intimate Partner Violence:     Fear of Current or Ex-Partner:     Emotionally Abused:     Physically Abused:     Sexually Abused:        Family History   Problem Relation Age of Onset    High Blood Pressure Mother     Dementia Mother     Heart Disease Father         CAD    High Blood Pressure Sister     Diabetes Sister        Current Outpatient Medications on File Prior to Visit   Medication Sig Dispense Refill    amLODIPine (NORVASC) 2.5 MG tablet TAKE ONE TABLET BY MOUTH DAILY 90 tablet 1    pantoprazole (PROTONIX) 20 MG tablet Take 1 tablet by mouth every morning (before breakfast) 30 tablet 2    fexofenadine (ALLEGRA ALLERGY) 180 MG tablet Take 1 tablet by mouth daily 90 tablet 1    fluticasone (FLONASE) 50 MCG/ACT nasal spray 2 sprays by Each Nostril route daily 3 Bottle 1    omeprazole (PRILOSEC) 20 MG delayed release capsule Take 1 capsule by mouth every morning (before breakfast) (Patient not taking: Reported on 2/22/2021) 30 capsule 0    estradiol (CLIMARA) 0.05 MG/24HR Place 1 patch onto the skin once a week (Patient not taking: Reported on 2/22/2021) 4 patch 5    estradiol (ESTRACE) 0.5 MG tablet Take 1 tablet by mouth daily (Patient not taking: Reported on 2/22/2021) 90 tablet 1     No current facility-administered medications on file prior to visit.        Pertinent items are noted in HPI  Review of systems reviewed from Patient History Form dated on 6/30/2021 and available in the patient's chart under the Media tab. PHYSICAL EXAMINATION:  Ms. Robert Rivas is a very pleasant 45 y.o.  female who presents today in no acute distress, awake, alert, and oriented. She is well dressed, nourished and  groomed. Patient with normal affect. Height is  5' 8\" (1.727 m), weight is 258 lb (117 kg), Body mass index is 39.23 kg/m². Resting respiratory rate is 16. Examination of the gait, showed that the patient walks heel-toe with a non-antalgic gait and no limp.  Examination of both ankles showing a good range of motion.  She has dorsiflexion to about 5 degrees bilaterally, which increased with knee flexion. She has intact sensation and good pedal pulses.  She has good strength in all four planes, including eversion, and has significant tenderness on deep palpation over the medial calcaneal tubercle, compared to the other side.  The ankles are stable to drawer test bilaterally, equally.      IMAGING:Xray's were reviewed, 3 views of the left foot taken in office today, and showed no acute fracture. No other abnormality. IMPRESSION: Left plantar fasciitis. PLAN: I discussed with the patient the treatment options. We recommended stretching exercises of the calf as well as stretching of the plantar fascia which was taught to the patient today. She will take NSAIDS Naprosyn, Rx sent. Use silicone heel pad. F/u in 6 weeks, PT if needed. She understands that this may take up to 6 months for the pain to resolve.        Dhiraj Pierce MD

## 2021-07-11 PROBLEM — M72.2 PLANTAR FASCIITIS, LEFT: Status: ACTIVE | Noted: 2021-07-11

## 2021-10-01 ENCOUNTER — OFFICE VISIT (OUTPATIENT)
Dept: FAMILY MEDICINE CLINIC | Age: 51
End: 2021-10-01
Payer: COMMERCIAL

## 2021-10-01 VITALS
HEART RATE: 76 BPM | SYSTOLIC BLOOD PRESSURE: 138 MMHG | HEIGHT: 69 IN | WEIGHT: 257 LBS | OXYGEN SATURATION: 99 % | BODY MASS INDEX: 38.06 KG/M2 | DIASTOLIC BLOOD PRESSURE: 80 MMHG

## 2021-10-01 DIAGNOSIS — M72.2 PLANTAR FASCIITIS, LEFT: ICD-10-CM

## 2021-10-01 DIAGNOSIS — I10 ESSENTIAL HYPERTENSION: Primary | ICD-10-CM

## 2021-10-01 DIAGNOSIS — Z12.11 SCREEN FOR COLON CANCER: ICD-10-CM

## 2021-10-01 DIAGNOSIS — E66.01 CLASS 2 SEVERE OBESITY DUE TO EXCESS CALORIES WITH SERIOUS COMORBIDITY AND BODY MASS INDEX (BMI) OF 38.0 TO 38.9 IN ADULT (HCC): ICD-10-CM

## 2021-10-01 PROBLEM — E66.812 CLASS 2 SEVERE OBESITY DUE TO EXCESS CALORIES WITH SERIOUS COMORBIDITY AND BODY MASS INDEX (BMI) OF 38.0 TO 38.9 IN ADULT: Status: ACTIVE | Noted: 2019-06-10

## 2021-10-01 PROCEDURE — 99214 OFFICE O/P EST MOD 30 MIN: CPT | Performed by: FAMILY MEDICINE

## 2021-10-01 RX ORDER — AMLODIPINE BESYLATE 2.5 MG/1
TABLET ORAL
Qty: 90 TABLET | Refills: 1 | Status: SHIPPED | OUTPATIENT
Start: 2021-10-01 | End: 2021-12-17

## 2021-10-01 NOTE — PROGRESS NOTES
10/1/2021    Blood pressure 138/80, pulse 76, height 5' 8.5\" (1.74 m), weight 257 lb (116.6 kg), SpO2 99 %, not currently breastfeeding. Rehana Mancini (:  1970) is a 45 y.o. female, here for evaluation of the following medical concerns:    Chief Complaint   Patient presents with    Hypertension     6 month f/u     Here for routine follow up of HTN. No hx of CAD, TIA, CVA or PVD. No longer using naproxen  Does not test at home. Weight unchanged. Last renal function test: (borderline potassium)  Lab Results   Component Value Date     2021    K 3.5 2021    BUN 11 2021    CREATININE 0.8 2021     Estimated Creatinine Clearance: 113 mL/min (based on SCr of 0.8 mg/dL). Last lipid test:  Lab Results   Component Value Date    CHOL 172 2021    TRIG 71 2021    HDL 64 (H) 2021    LDLCALC 94 2021     Lab Results   Component Value Date    ALT 23 2021    AST 24 2021     seeing Dr Huey Vidal for plantar fasciitis. Doing stretches. She has not completed colonoscopy yet. Dr Dayanara Cross did not do them on  and that is her only day off. Aaliyahs danielle referral for another option. Patient Active Problem List   Diagnosis    Essential hypertension    H/O colonoscopy  (for IBS). benign polyps removed.  Sprain of anterior talofibular ligament of left ankle    Menopausal symptoms    Class 1 obesity due to excess calories with serious comorbidity and body mass index (BMI) of 32.0 to 32.9 in adult    Irritable bowel syndrome with diarrhea    Idiopathic thrombocytopenic purpura (HCC) mild, sees Dr Rashaad Parra    Plantar fasciitis, left        Body mass index is 38.51 kg/m².     Wt Readings from Last 3 Encounters:   10/01/21 257 lb (116.6 kg)   21 258 lb (117 kg)   21 258 lb 9.6 oz (117.3 kg)       BP Readings from Last 3 Encounters:   10/01/21 138/80   21 (!) 178/81   21 124/86       Allergies   Allergen Reactions    Oxycodone-Acetaminophen Itching       Prior to Visit Medications    Medication Sig Taking? Authorizing Provider   amLODIPine (NORVASC) 2.5 MG tablet TAKE ONE TABLET BY MOUTH DAILY Yes Beatriz Marley MD   pantoprazole (PROTONIX) 20 MG tablet Take 1 tablet by mouth every morning (before breakfast) Yes Beatriz Marley MD   fexofenadine TY [facility] ALLERGY) 180 MG tablet Take 1 tablet by mouth daily Yes Beatriz Marley MD   fluticasone [facility]) 50 MCG/ACT nasal spray 2 sprays by Each Nostril route daily Yes Beatriz Marley MD   omeprazole (PRILOSEC) 20 MG delayed release capsule Take 1 capsule by mouth every morning (before breakfast) Yes LANEY Dennison CNP   naproxen (NAPROSYN) 500 MG tablet Take 1 tablet by mouth 2 times daily (with meals)  Flaco Hung MD   estradiol (CLIMARA) 0.05 MG/24HR Place 1 patch onto the skin once a week  Patient not taking: Reported on 2/22/2021  Beatriz Marley MD   estradiol (ESTRACE) 0.5 MG tablet Take 1 tablet by mouth daily  Patient not taking: Reported on 2/22/2021  Beatriz Marley MD        Social History     Tobacco Use    Smoking status: Never Smoker    Smokeless tobacco: Never Used   Substance Use Topics    Alcohol use: Yes     Alcohol/week: 0.0 standard drinks     Comment: socially    Drug use: No       Review of Systems As above     Physical Exam  Vitals and nursing note reviewed. Constitutional:       Appearance: Normal appearance. She is well-developed. Neck:      Thyroid: No thyromegaly. Vascular: No carotid bruit. Cardiovascular:      Rate and Rhythm: Normal rate and regular rhythm. Pulses: Normal pulses. Heart sounds: Normal heart sounds. No murmur heard. Pulmonary:      Effort: Pulmonary effort is normal. No respiratory distress. Breath sounds: Normal breath sounds. No wheezing or rales. Musculoskeletal:         General: Normal range of motion.       Cervical back: Normal range of motion and neck supple. Right lower leg: No edema. Left lower leg: No edema. Comments: + TTP left inferior calcaneus, anterior edge   Skin:     General: Skin is warm and dry. Neurological:      General: No focal deficit present. Mental Status: She is alert and oriented to person, place, and time. Mental status is at baseline. Psychiatric:         Mood and Affect: Mood normal.         Behavior: Behavior normal.         Thought Content: Thought content normal.         Judgment: Judgment normal.         ASSESSMENT/PLAN:    1. Essential hypertension  - Blood pressure is at goal on current therapy; continue meds and monitoring. Regular physical activity and healthy diet (low sodium, multiple servings of produce daily) was recommended. Renal function to be assessed yearly. 2. Class 2 severe obesity due to excess calories with serious comorbidity and body mass index (BMI) of 38.0 to 38.9 in [facility])  The patient is asked to make an attempt to improve diet and exercise patterns to aid in medical management of this problem. discussed ways to approach calorie reduction- pick one goal and hold on to make it routine. Then can tackle another small goal.    3. Plantar fasciitis, left  - discussed basic principles of self care for this. (see patient instructions)    4. Screen for colon cancer  - [facility] - Ronit Silverio DO, Gastroenterology, [facility]      Return in about 6 months (around 4/1/2022) for follow up HTN, fasting labs. An  Fast[employer]ignature was used to authenticate this note.     --Shaka Agustin MD on 10/1/2021 at 3:16 PM

## 2021-10-04 NOTE — PROGRESS NOTES
12/3/2020    TELEHEALTH EVALUATION -- Audio/Visual (During QVUBK-81 public health emergency)    HPI:    Mohini Reina (:  1970) has requested an audio/video evaluation for the following concern(s):  Chief Complaint   Patient presents with    Headache     headaches, body aches, for 3 days     Patient reports that on 20 started with muscle aches and sinus headache. Denies sore throat, fever, congestion, cough. She works at a nursing home. Had rapid COVID-19 yesterday that was negative. Went again today and had another COVID test completed today that was sent to lab. Feels that headache is a little better today. Yesterday took ibuprofen and stayed in bed most of the day. Hypertension:  Home blood pressure monitoring: No.  She is not adherent to a low sodium diet. Patient denies chest pain, shortness of breath, palpitations and dry cough. Antihypertensive medication side effects: no medication side effects noted. Sodium (mmol/L)   Date Value   06/10/2019 142    BUN (mg/dL)   Date Value   06/10/2019 12    Glucose (mg/dL)   Date Value   06/10/2019 73      Potassium (mmol/L)   Date Value   06/10/2019 4.5    CREATININE (mg/dL)   Date Value   06/10/2019 0.8           Review of Systems   Constitutional: Positive for activity change and fatigue. Negative for fever. HENT: Negative for congestion, postnasal drip, rhinorrhea, sinus pressure, sinus pain and sore throat. Respiratory: Negative for cough and shortness of breath. Cardiovascular: Negative for chest pain, palpitations and leg swelling. Gastrointestinal: Negative for abdominal pain, diarrhea, nausea and vomiting. Neurological: Positive for headaches. Negative for dizziness and syncope. Prior to Visit Medications    Medication Sig Taking?  Authorizing Provider   amLODIPine (NORVASC) 2.5 MG tablet Take 1 tablet by mouth daily Yes Alice Bartlett MD   estradiol [facility]) 0.05 A catheter was exchanged for a (Cath Expo Angio 6f Multipak) catheter. JR 4. MG/24HR Place 1 patch onto the skin once a week  Patient not taking: Reported on 12/3/2020  Angelia Bruno MD   estradiol (ESTRACE) 0.5 MG tablet Take 1 tablet by mouth daily  Patient not taking: Reported on 12/3/2020  Angelia Bruno MD       Social History     Tobacco Use    Smoking status: Never Smoker    Smokeless tobacco: Never Used   Substance Use Topics    Alcohol use: Yes     Alcohol/week: 0.0 standard drinks     Comment: socially    Drug use: No        PHYSICAL EXAMINATION:  [ INSTRUCTIONS:  \"[x]\" Indicates a positive item  \"[]\" Indicates a negative item  -- DELETE ALL ITEMS NOT EXAMINED]  Patient-Reported Vitals 12/3/2020   Patient-Reported Temperature 97.9        Constitutional: [x] Appears well-developed and well-nourished [x] No apparent distress      [] Abnormal-   Mental status  [x] Alert and awake  [x] Oriented to person/place/time [x]Able to follow commands      Eyes:  EOM    [x]  Normal  [] Abnormal-  Sclera  [x]  Normal  [] Abnormal -         Discharge [x]  None visible  [] Abnormal -    HENT:   [x] Normocephalic, atraumatic. [] Abnormal   [x] Mouth/Throat: Mucous membranes are moist.     External Ears [x] Normal  [] Abnormal-     Neck: [x] No visualized mass     Pulmonary/Chest: [x] Respiratory effort normal.  [x] No visualized signs of difficulty breathing or respiratory distress        [] Abnormal-      Musculoskeletal:   [] Normal gait with no signs of ataxia         [x] Normal range of motion of neck        [] Abnormal-       Neurological:        [x] No Facial Asymmetry (Cranial nerve 7 motor function) (limited exam to video visit)          [x] No gaze palsy        [] Abnormal-         Skin:        [x] No significant exanthematous lesions or discoloration noted on facial skin         [] Abnormal-            Psychiatric:       [x] Normal Affect [x] No Hallucinations        [] Abnormal-     Other pertinent observable physical exam findings-     ASSESSMENT/PLAN:  1.  Acute nonintractable headache  Headache has improved from onset on 12/1/20. She has a second COVID-19 test pending from her work. Advised to rest and hydrate with water. Can take OTC tylenol per bottle instructions PRN. Cool compress PRN. Patient is to call if symptoms worsen or fail to continue to improve. She is to stay off work till her COVID-19 testing results have be obtained by her employer. 2. Myalgia  Symptoms have improved from onset. Advised to rest and hydrate with water. Advised to take OTC tylenol per bottle instructions PRN. Patient is to call if symptoms worsen or fail to continue to improve. 3. HTN- Unknown control. Advised to monitor home BP. Last in person OV BP was not controlled. Currently on amlodipine 2.5 mg daily. Advised to come into the office once feeling better for BP monitoring. Return if symptoms worsen or fail to improve. Nava Mejia is a 45 y.o. female being evaluated by a Virtual Visit (video visit) encounter to address concerns as mentioned above. A caregiver was present when appropriate. Due to this being a TeleHealth encounter (During [facility]- public health emergency), evaluation of the following organ systems was limited: Vitals/Constitutional/EENT/Resp/CV/GI//MS/Neuro/Skin/Heme-Lymph-Imm. Pursuant to the emergency declaration under the [address] [place] [address], [address] [place] [address] authority and the [employer] Act, this Virtual Visit was conducted with patient's (and/or legal guardian's) consent, to reduce the patient's risk of exposure to COVID-19 and provide necessary medical care. The patient (and/or legal guardian) has also been advised to contact this office for worsening conditions or problems, and seek emergency medical treatment and/or call 911 if deemed necessary.      Patient identification was verified at the start of the visit: Yes    Total time spent on this encounter: Not billed by time    Services were provided through a video synchronous discussion virtually to substitute for in-person clinic visit. Patient and provider were located at their individual homes. --LANEY Marquez CNP on 12/3/2020 at 3:36 PM    An electronic signature was used to authenticate this note.

## 2021-12-17 ENCOUNTER — OFFICE VISIT (OUTPATIENT)
Dept: FAMILY MEDICINE CLINIC | Age: 51
End: 2021-12-17
Payer: COMMERCIAL

## 2021-12-17 VITALS
DIASTOLIC BLOOD PRESSURE: 90 MMHG | HEART RATE: 77 BPM | WEIGHT: 268 LBS | SYSTOLIC BLOOD PRESSURE: 140 MMHG | BODY MASS INDEX: 39.69 KG/M2 | OXYGEN SATURATION: 98 % | HEIGHT: 69 IN

## 2021-12-17 DIAGNOSIS — M54.42 ACUTE BILATERAL LOW BACK PAIN WITH BILATERAL SCIATICA: Primary | ICD-10-CM

## 2021-12-17 DIAGNOSIS — I10 ESSENTIAL HYPERTENSION: ICD-10-CM

## 2021-12-17 DIAGNOSIS — M72.2 PLANTAR FASCIITIS, LEFT: ICD-10-CM

## 2021-12-17 DIAGNOSIS — D48.7 NEOPLASM OF UNCERTAIN BEHAVIOR OF NECK: ICD-10-CM

## 2021-12-17 DIAGNOSIS — M54.41 ACUTE BILATERAL LOW BACK PAIN WITH BILATERAL SCIATICA: Primary | ICD-10-CM

## 2021-12-17 PROCEDURE — 99214 OFFICE O/P EST MOD 30 MIN: CPT | Performed by: FAMILY MEDICINE

## 2021-12-17 PROCEDURE — 17110 DESTRUCTION B9 LES UP TO 14: CPT | Performed by: FAMILY MEDICINE

## 2021-12-17 RX ORDER — AMLODIPINE BESYLATE 5 MG/1
TABLET ORAL
Qty: 90 TABLET | Refills: 1 | Status: SHIPPED
Start: 2021-12-17 | End: 2022-04-04 | Stop reason: ALTCHOICE

## 2021-12-17 RX ORDER — TIZANIDINE 4 MG/1
4 TABLET ORAL 3 TIMES DAILY
Qty: 30 TABLET | Refills: 0 | Status: SHIPPED | OUTPATIENT
Start: 2021-12-17 | End: 2021-12-27

## 2021-12-17 NOTE — PROGRESS NOTES
2021    Blood pressure (!) 140/90, pulse 77, height 5' 8.5\" (1.74 m), weight 268 lb (121.6 kg), SpO2 98 %, not currently breastfeeding. Corinne Moors (:  1970) is a 45 y.o. female, here for evaluation of the following medical concerns:    Chief Complaint   Patient presents with    Back Pain     sciatica pain in low back for 1 month     Here for back pain. Onset a month ago, or longer. No injury. No new activities. (no exercise in past  6 mo due to plantar fasciitis)  Lower back, below belt line, radiates to buttocks and down both legs to bilat posterior ankles. Pain increases with standing/walkng  Better with sitting/laying down. Hurts to bend over. Get in/out of car    Feels best at in bed at night. But by morning is achy in her legs. No numbness/tingling  No weakness  No loss of bladder/bowel function    Treatment: aleve OTC. Helped at first.  Last dose was several days ago. Has had back pain before-  had similar pain that did not radiate. Went to ED, got Vicodin and it resolved. No problem till now. She also has growth on her left neck for more than 6 mo. Is growing, getting scaly. Is painful when things rub against it, which is any clothing. No bleeding  Rubs pilow and gets irritated. Rubs on seat belt. Wants it removed  Not a recurrent lesion. Patient Active Problem List   Diagnosis    Essential hypertension    H/O colonoscopy  (for IBS). benign polyps removed.  Sprain of anterior talofibular ligament of left ankle    Menopausal symptoms    Class 2 severe obesity due to excess calories with serious comorbidity and body mass index (BMI) of 38.0 to 38.9 in adult [facility])    Irritable bowel syndrome with diarrhea    Idiopathic thrombocytopenic purpura (HCC) mild, sees Dr Camelia Pond    Plantar fasciitis, left        Body mass index is 40.16 kg/m².     Wt Readings from Last 3 Encounters:   21 268 lb (121.6 kg)   10/01/21 257 lb (116.6 kg) 06/30/21 258 lb (117 kg)       BP Readings from Last 3 Encounters:   12/17/21 (!) 140/90   10/01/21 138/80   06/29/21 (!) 178/81       Allergies   Allergen Reactions    Oxycodone-Acetaminophen Itching       Prior to Visit Medications    Medication Sig Taking? Authorizing Provider   amLODIPine (NORVASC) 2.5 MG tablet TAKE ONE TABLET BY MOUTH DAILY Yes Beatriz Marley MD   pantoprazole (PROTONIX) 20 MG tablet Take 1 tablet by mouth every morning (before breakfast) Yes Beatriz Marley MD   fexofenadine TY [facility] ALLERGY) 180 MG tablet Take 1 tablet by mouth daily Yes Beatriz Marley MD   fluticasone [facility]) 50 MCG/ACT nasal spray 2 sprays by Each Nostril route daily Yes Beatriz Marley MD        Social History     Tobacco Use    Smoking status: Never Smoker    Smokeless tobacco: Never Used   Substance Use Topics    Alcohol use: Yes     Alcohol/week: 0.0 standard drinks     Comment: socially    Drug use: No       Review of Systems  No chest pains, dizziness, heart palpitations, dyspnea, lightheadedness, worsening edema. Physical Exam  Vitals and nursing note reviewed. Constitutional:       General: She is not in acute distress. Appearance: Normal appearance. She is obese. She is not ill-appearing. Pulmonary:      Effort: Pulmonary effort is normal.   Musculoskeletal:      Comments: Lumbar spine: FROM. SLR neg. DTR's intact. Sensation normal LE's. Nontender over lumbar spine. But palpation of R SI and buttock best reproduces her pain. Skin:     Comments: 2x4 mm verrucous oval lesion left lateral neck. Neurological:      General: No focal deficit present. Mental Status: She is alert and oriented to person, place, and time. Mental status is at baseline. Psychiatric:         Mood and Affect: Mood normal.         Behavior: Behavior normal.         Thought Content: Thought content normal.         Judgment: Judgment normal.         ASSESSMENT/PLAN:    1.  Acute bilateral low back pain with bilateral sciatica  - discussed that her pain could be due to SI dysfunction or degenerative arthritis/spinal stenosis. Either way, is mechanical and would benefit from PT.  rx zanaflex, mainly at night. Needs xrs to rule out spondylolisthesis. - External Referral To Physical Therapy  - XR LUMBAR SPINE (MIN 4 VIEWS); Future    2. Plantar fasciitis, left  - recommended Dr Denise Farooq for intensified treatment of this  - External Referral To Podiatry    3. Neoplasm of uncertain behavior of neck  - patient desires removal of this lesion. Shave Bx Procedure Note     Pre-operative Diagnosis: irritated skin tag     Locations: left neck, 2x4 mm     Indications: discomfort, irritation     Anesthesia: local     Procedure Details   The risks, benefits, indications, potential complications, and alternatives were explained to the patient and verbal informed consent obtained.     Alcohol and betadine prep. 1% lido with epi x 0.3 cc local.  Lesion removed with dermablade for shave biopsy. Lesion to pathology. EBL <1cc. Hemostasis with pressure and Monsel's. Dressing applied and after care discussed. Complications:  None. - 52053 - [place] EXC SKIN BENIG <5MM REMAINDR BODY  - Specimen to Pathology Outpatient    4. Essential hypertension  - repeat bp stable. Continue norvasc 5 mg. Keep appt in April for BP f/u    An  electronicsignature was used to authenticate this note.     --Reynaldo Christian MD on 12/17/2021 at 4:01 PM

## 2022-01-06 NOTE — PROGRESS NOTES
[facility] [facility] time____1/12/22 0800________        Surgery time____0900________    Take the following medications with a sip of water:    Do not eat or drink anything after 12:00 midnight prior to your surgery. This includes water chewing gum, mints and ice chips. You may brush your teeth and gargle the morning of your surgery, but do not swallow the water     Please see your family doctor/pediatrician for a history and physical and/or concerning medications. Bring any test results/reports from your physicians office. If you are under the care of a heart doctor or specialist doctor, please be aware that you may be asked to them for clearance    You may be asked to stop blood thinners such as Coumadin, Plavix, Fragmin, Lovenox, etc., or any anti-inflammatories such as:  Aspirin, Ibuprofen, Advil, Naproxen prior to your surgery. We also ask that you stop any OTC medications such as fish oil, vitamin E, glucosamine, garlic, Multivitamins, COQ 10, etc.    We ask that you do not smoke 24 hours prior to surgery  We ask that you do not  drink any alcoholic beverages 24 hours prior to surgery     You must make arrangements for a responsible adult to take you home after your surgery. For your safety you will not be allowed to leave alone or drive yourself home. Your surgery will be cancelled if you do not have a ride home. Also for your safety, it is strongly suggested that someone stay with you the first 24 hours after your surgery. A parent or legal guardian must accompany a child scheduled for surgery and plan to stay at the hospital until the child is discharged. Please do not bring other children with you. For your comfort, please wear simple loose fitting clothing to the hospital.  Please do not bring valuables.     Do not wear any make-up or nail polish on your fingers or toes      For your safety, please do not wear any jewelry or body piercing's on the day of surgery. All jewelry must be removed. If you have dentures, they will be removed before going to operating room. For your convenience, we will provide you with a container. If you wear contact lenses or glasses, they will be removed, please bring a case for them. If you have a living will and a durable power of  for healthcare, please bring in a copy. As part of our patient safety program to minimize surgical site infections, we ask you to do the following:    · Please notify your surgeon if you develop any illness between         now and the  day of your surgery. · This includes a cough, cold, fever, sore throat, nausea,         or vomiting, and diarrhea, etc.  ·  Please notify your surgeon if you experience dizziness, shortness         of breath or blurred vision between now and the time of your surgery. Do not shave your operative site 96 hours prior to surgery. For face and neck surgery, men may use an electric razor 48 hours   prior to surgery. You may shower the night before surgery or the morning of   your surgery with an antibacterial soap. You will need to bring a photo ID and insurance card    [facility] has an onsite pharmacy, would you like to utilize our pharmacy     If you will be staying overnight and use a C-pap machine, please bring   your C-pap to hospital     Our goal is to provide you with excellent care, therefore, visitors will be limited to two(2) in the room at a time so that we may focus on providing this care for you. Please contact pre-admission testing if you have any further questions. [facility] phone number:  [phone]  Please note these are generalized instructions for all surgical cases, you may be provided with more specific instructions according to your surgery.

## 2022-01-10 ENCOUNTER — ANESTHESIA EVENT (OUTPATIENT)
Dept: ENDOSCOPY | Age: 52
End: 2022-01-10
Payer: COMMERCIAL

## 2022-01-12 ENCOUNTER — HOSPITAL ENCOUNTER (OUTPATIENT)
Age: 52
Setting detail: OUTPATIENT SURGERY
Discharge: HOME | End: 2022-01-12
Attending: INTERNAL MEDICINE | Admitting: INTERNAL MEDICINE
Payer: COMMERCIAL

## 2022-01-12 ENCOUNTER — ANESTHESIA (OUTPATIENT)
Dept: ENDOSCOPY | Age: 52
End: 2022-01-12
Payer: COMMERCIAL

## 2022-01-12 VITALS
WEIGHT: 264.5 LBS | RESPIRATION RATE: 16 BRPM | BODY MASS INDEX: 40.09 KG/M2 | OXYGEN SATURATION: 98 % | HEART RATE: 65 BPM | DIASTOLIC BLOOD PRESSURE: 88 MMHG | HEIGHT: 68 IN | TEMPERATURE: 96.4 F | SYSTOLIC BLOOD PRESSURE: 146 MMHG

## 2022-01-12 VITALS — OXYGEN SATURATION: 100 % | DIASTOLIC BLOOD PRESSURE: 68 MMHG | SYSTOLIC BLOOD PRESSURE: 110 MMHG

## 2022-01-12 DIAGNOSIS — Z12.11 SCREEN FOR COLON CANCER: ICD-10-CM

## 2022-01-12 PROCEDURE — 88305 TISSUE EXAM BY PATHOLOGIST: CPT

## 2022-01-12 PROCEDURE — 2580000003 HC RX 258: Performed by: NURSE ANESTHETIST, CERTIFIED REGISTERED

## 2022-01-12 PROCEDURE — 3609010600 HC COLONOSCOPY POLYPECTOMY SNARE/COLD BIOPSY: Performed by: INTERNAL MEDICINE

## 2022-01-12 PROCEDURE — 2709999900 HC NON-CHARGEABLE SUPPLY: Performed by: INTERNAL MEDICINE

## 2022-01-12 PROCEDURE — 2580000003 HC RX 258: Performed by: STUDENT IN AN ORGANIZED HEALTH CARE EDUCATION/TRAINING PROGRAM

## 2022-01-12 PROCEDURE — 7100000010 HC PHASE II RECOVERY - FIRST 15 MIN: Performed by: INTERNAL MEDICINE

## 2022-01-12 PROCEDURE — 3700000000 HC ANESTHESIA ATTENDED CARE: Performed by: INTERNAL MEDICINE

## 2022-01-12 PROCEDURE — 6360000002 HC RX W HCPCS: Performed by: NURSE ANESTHETIST, CERTIFIED REGISTERED

## 2022-01-12 PROCEDURE — 3700000001 HC ADD 15 MINUTES (ANESTHESIA): Performed by: INTERNAL MEDICINE

## 2022-01-12 PROCEDURE — 7100000011 HC PHASE II RECOVERY - ADDTL 15 MIN: Performed by: INTERNAL MEDICINE

## 2022-01-12 RX ORDER — SODIUM CHLORIDE 0.9 % (FLUSH) 0.9 %
5-40 SYRINGE (ML) INJECTION EVERY 12 HOURS SCHEDULED
Status: DISCONTINUED | OUTPATIENT
Start: 2022-01-12 | End: 2022-01-12 | Stop reason: HOSPADM

## 2022-01-12 RX ORDER — SODIUM CHLORIDE 0.9 % (FLUSH) 0.9 %
5-40 SYRINGE (ML) INJECTION PRN
Status: DISCONTINUED | OUTPATIENT
Start: 2022-01-12 | End: 2022-01-12 | Stop reason: HOSPADM

## 2022-01-12 RX ORDER — SODIUM CHLORIDE 9 MG/ML
INJECTION, SOLUTION INTRAVENOUS CONTINUOUS
Status: DISCONTINUED | OUTPATIENT
Start: 2022-01-12 | End: 2022-01-12 | Stop reason: HOSPADM

## 2022-01-12 RX ORDER — SODIUM CHLORIDE 9 MG/ML
INJECTION, SOLUTION INTRAVENOUS CONTINUOUS PRN
Status: DISCONTINUED | OUTPATIENT
Start: 2022-01-12 | End: 2022-01-12 | Stop reason: SDUPTHER

## 2022-01-12 RX ORDER — SODIUM CHLORIDE 9 MG/ML
25 INJECTION, SOLUTION INTRAVENOUS PRN
Status: DISCONTINUED | OUTPATIENT
Start: 2022-01-12 | End: 2022-01-12 | Stop reason: HOSPADM

## 2022-01-12 RX ORDER — PROPOFOL 10 MG/ML
INJECTION, EMULSION INTRAVENOUS PRN
Status: DISCONTINUED | OUTPATIENT
Start: 2022-01-12 | End: 2022-01-12 | Stop reason: SDUPTHER

## 2022-01-12 RX ADMIN — PROPOFOL 50 MG: 10 INJECTION, EMULSION INTRAVENOUS at 08:55

## 2022-01-12 RX ADMIN — PROPOFOL 90 MG: 10 INJECTION, EMULSION INTRAVENOUS at 08:50

## 2022-01-12 RX ADMIN — PROPOFOL 30 MG: 10 INJECTION, EMULSION INTRAVENOUS at 09:02

## 2022-01-12 RX ADMIN — SODIUM CHLORIDE: 9 INJECTION, SOLUTION INTRAVENOUS at 08:44

## 2022-01-12 RX ADMIN — PROPOFOL 60 MG: 10 INJECTION, EMULSION INTRAVENOUS at 08:53

## 2022-01-12 RX ADMIN — PROPOFOL 50 MG: 10 INJECTION, EMULSION INTRAVENOUS at 08:59

## 2022-01-12 RX ADMIN — SODIUM CHLORIDE: 9 INJECTION, SOLUTION INTRAVENOUS at 08:17

## 2022-01-12 ASSESSMENT — PAIN SCALES - GENERAL
PAINLEVEL_OUTOF10: 0

## 2022-01-12 ASSESSMENT — PAIN - FUNCTIONAL ASSESSMENT: PAIN_FUNCTIONAL_ASSESSMENT: 0-10

## 2022-01-12 ASSESSMENT — LIFESTYLE VARIABLES: SMOKING_STATUS: 0

## 2022-01-12 NOTE — ANESTHESIA POSTPROCEDURE EVALUATION
Department of Anesthesiology  Postprocedure Note    Patient: Taz Kenney  MRN: [id number]  YOB: 1970  Date of evaluation: 1/12/2022  Time:  9:12 AM     Procedure Summary     Date: 01/12/22 Room / Location: [place] [address] / [facility]    Anesthesia Start: 3810 Anesthesia Stop: 7320    Procedure: COLONOSCOPY POLYPECTOMY SNARE/COLD BIOPSY (N/A ) Diagnosis:       Screen for colon cancer      (Screen for colon cancer)    Surgeons: Rachel Swartz DO Responsible Provider: Milton Stokes MD    Anesthesia Type: MAC ASA Status: 3          Anesthesia Type: MAC    Kamryn Phase I: Kamryn Score: 10    Kamryn Phase II: Kamryn Score: 10    Last vitals: Reviewed and per EMR flowsheets. Anesthesia Post Evaluation    Patient location during evaluation: PACU  Patient participation: complete - patient participated  Level of consciousness: awake and alert  Airway patency: patent  Nausea & Vomiting: no nausea and no vomiting  Complications: no  Cardiovascular status: hemodynamically stable and blood pressure returned to baseline  Respiratory status: spontaneous ventilation, nonlabored ventilation and room air  Hydration status: stable  Comments: Uneventful MAC anesthetic, transported to PACU on nasal cannula. Ms. Sari Macario resting comfortably following procedure. Appropriate for discharge home with , Sharlene Vora, who is present at bedside.      MD Milton Givens MD

## 2022-01-12 NOTE — ANESTHESIA PRE PROCEDURE
Department of Anesthesiology  Preprocedure Note       Name:  George Frederick   Age:  45 y.o.  :  1970                                          MRN:  [id number]         Date:  2022      Surgeon: Lyudmila Elliott):  Octavio Mccracken,     Procedure: Procedure(s):  COLORECTAL CANCER SCREENING, NOT HIGH RISK    Medications prior to admission:   Prior to Admission medications    Medication Sig Start Date End Date Taking? Authorizing Provider   amLODIPine (NORVASC) 5 MG tablet TAKE ONE TABLET BY MOUTH DAILY 21  Yes Louisa Canas MD   fexofenadine TY [facility], [facility] ALLERGY) 180 MG tablet Take 1 tablet by mouth daily  Patient taking differently: Take 180 mg by mouth as needed  21  Yes Louisa Canas MD   fluticasone Creasie Jose) 50 MCG/ACT nasal spray 2 sprays by Each Nostril route daily  Patient taking differently: 2 sprays by Each Nostril route as needed  21   Louisa Canas MD       Current medications:    No current facility-administered medications for this encounter. Current Outpatient Medications   Medication Sig Dispense Refill    amLODIPine (NORVASC) 5 MG tablet TAKE ONE TABLET BY MOUTH DAILY 90 tablet 1    fexofenadine (ALLEGRA ALLERGY) 180 MG tablet Take 1 tablet by mouth daily (Patient taking differently: Take 180 mg by mouth as needed ) 90 tablet 1    fluticasone (FLONASE) 50 MCG/ACT nasal spray 2 sprays by Each Nostril route daily (Patient taking differently: 2 sprays by Each Nostril route as needed ) 3 Bottle 1       Allergies: Allergies   Allergen Reactions    Oxycodone-Acetaminophen Itching       Problem List:    Patient Active Problem List   Diagnosis Code    Essential hypertension I10    H/O colonoscopy  (for IBS). benign polyps removed.  Z98.890    Sprain of anterior talofibular ligament of left ankle S93.492A    Menopausal symptoms N95.1    Class 2 severe obesity due to excess calories with serious comorbidity and body mass index (BMI) of 38.0 to 38.9 in adult [facility]) E66.01, Z68.38    Irritable bowel syndrome with diarrhea K58.0    Idiopathic thrombocytopenic purpura (HCC) mild, sees Dr Ever Ramirez D69.3    Plantar fasciitis, left M72.2       Past Medical History:        Diagnosis Date    HTN (hypertension)     IBS (irritable bowel syndrome)     Lactose intolerance        Past Surgical History:        Procedure Laterality Date    COLONOSCOPY      CYST REMOVAL      LADAN AND BSO  10/27/2016    with lysis of adhesions; Dr Gilberto Barnes       Social History:    Social History     Tobacco Use    Smoking status: Never Smoker    Smokeless tobacco: Never Used   Substance Use Topics    Alcohol use: Yes     Alcohol/week: 0.0 standard drinks     Comment: socially                                Counseling given: Not Answered      Vital Signs (Current):   Vitals:    01/06/22 1230   Weight: 257 lb (116.6 kg)   Height: 5' 8.5\" (1.74 m)                                              BP Readings from Last 3 Encounters:   12/17/21 (!) 140/90   10/01/21 138/80   06/29/21 (!) 178/81       NPO Status:                                                                                 BMI:   Wt Readings from Last 3 Encounters:   12/17/21 268 lb (121.6 kg)   10/01/21 257 lb (116.6 kg)   06/30/21 258 lb (117 kg)     Body mass index is 38.51 kg/m².     CBC:   Lab Results   Component Value Date    WBC 6.1 06/29/2021    RBC 4.59 06/29/2021    HGB 13.4 06/29/2021    HCT 39.2 06/29/2021    MCV 85.3 06/29/2021    RDW 13.7 06/29/2021     06/29/2021       CMP:   Lab Results   Component Value Date     06/29/2021    K 3.5 06/29/2021     06/29/2021    CO2 24 06/29/2021    BUN 11 06/29/2021    CREATININE 0.8 06/29/2021    GFRAA >60 06/29/2021    GFRAA >60 06/15/2012    AGRATIO 1.3 02/22/2021    LABGLOM >60 06/29/2021    GLUCOSE 103 06/29/2021    PROT 7.5 02/22/2021    PROT 7.1 06/15/2012    CALCIUM 9.3 06/29/2021    BILITOT <0.2 02/22/2021    ALKPHOS 161 02/22/2021 AST 24 02/22/2021    ALT 23 02/22/2021       POC Tests: No results for input(s): POCGLU, POCNA, POCK, POCCL, POCBUN, POCHEMO, POCHCT in the last 72 hours. Coags: No results found for: PROTIME, INR, APTT    HCG (If Applicable): No results found for: PREGTESTUR, PREGSERUM, HCG, HCGQUANT     ABGs: No results found for: PHART, PO2ART, TZB8OBB, BUR0CTF, BEART, I8FZPYDR     Type & Screen (If Applicable):  Lab Results   Component Value Date    LABABO B 02/22/2016    [address] Positive 02/22/2016       Drug/Infectious Status (If Applicable):  No results found for: HIV, HEPCAB    COVID-19 Screening (If Applicable):   Lab Results   Component Value Date    COVID19 Not Detected 07/03/2020           Anesthesia Evaluation   no history of anesthetic complications:   Airway: Mallampati: II  TM distance: >3 FB   Neck ROM: full  Comment: Prior airway:  Scope used: Small; Blade size: 2; Tube type: ETT - Standard; Tube size: 7 mm; Technique: DL; Technique comment: Dentition intact, Atraumatic; Quality of view: 1; Ease: 1; Cuff to seal: Yes; Secured at: 21 cm; Humidvent: Yes; Verified by: Auscultation, Capnometry; Attempts: 1; Removal condition: Suctioned, Breathing Well    Neck circumference normal  Mouth opening: > = 3 FB Dental:      Comment: Denies loose teeth    Pulmonary: breath sounds clear to auscultation      (-) COPD, asthma, rhonchi, wheezes, rales and not a current smoker                           Cardiovascular:    (+) hypertension:,     (-) orthopnea,  FOSTER, weak pulses, peripheral edema and no hyperlipidemia      Rhythm: regular  Rate: normal                    Neuro/Psych:      (-) seizures, TIA and CVA           GI/Hepatic/Renal:   (+) morbid obesity (BMI: 40.2)     (-) liver disease and no renal disease      ROS comment: + IBS  + h/o polyps. Endo/Other:    (+) blood dyscrasia (ITP, mild)::., .    (-) diabetes mellitus               Abdominal:   (+) obese,           Vascular:           Other Findings: Anesthesia Plan      MAC     ASA 3     (NPO appropriate; denies nausea / symptomatic GERD.)  Induction: intravenous. Anesthetic plan and risks discussed with patient. Plan discussed with CRNA. This pre-anesthesia assessment may be used as a history and physical.    DOS STAFF ADDENDUM:    Pt seen and examined, chart reviewed (including anesthesia, drug and allergy history). No interval changes to history and physical examination. Anesthetic plan, risks, benefits, alternatives, and personnel involved discussed with patient. Patient verbalized an understanding and agrees to proceed.       Nicole Mason MD  January 12, 2022  8:12 AM

## 2022-01-12 NOTE — OP NOTE
Colonoscopy Procedure Note      Patient: Dao Peng  : 1970  Acct#:     Procedure: Colonoscopy with polypectomy (cold snare), polypectomy (cold biopsy)    Date:  2022    Surgeon:  Lloyd Schofield DO    Referring Physician:  Clarance Gosselin, MD    Previous Colonoscopy: YES  Date:   Greater than 3 years: YES    Preoperative Diagnosis:  1) Screening colonoscopy- Average risk     Postoperative Diagnosis:  1) A 2 mm polyp in the descending colon was removed completely with biopsy polypectomy. 2) A 3 mm polyp in the sigmoid colon was removed completely with cold snare polypectomy. 3) A 4 mm polyp in the sigmoid colon was removed completely with cold snare polypectomy. 4) Mild sigmoid colon diverticulosis was noted. 5) Moderate internal hemorrhoids were noted. Consent:  The patient or their legal guardian has signed a consent, and is aware of the potential risks, benefits, alternatives, and potential complications of this procedure. These include, but are not limited to hemorrhage, bleeding, post procedural pain, perforation, phlebitis, aspiration, hypotension, hypoxia, cardiovascular events such as arryhthmia, and possibly death. Additionally, the possibility of missed colonic polyps and interval colon cancer was discussed in the consent. Anesthesia:  The patient was administered TIVA per anesthesiology team.  Please see their operative records for full details of medications administered. Procedure: An informed consent was obtained from the patient after explanation of indications, benefits, possible risks and complications of the procedure. The patient was then taken to the endoscopy suite, placed in the left lateral decubitus position, and the above IV anesthesia was administered. A digital rectal examination was performed and revealed negative without mass, lesions or tenderness, internal hemorrhoids noted.       The Olympus video colonoscope was placed in the patient's rectum under digital direction and advanced to the cecum. The cecum was identified by characteristic anatomy and ballottment. The ileocecal valve was identified. The preparation was good. The terminal ileum was normal.    The scope was then withdrawn back through the cecum, ascending, transverse, descending, sigmoid colon, and rectum. Careful circumferential examination of the mucosa in these areas demonstrated:    1) A 2 mm polyp in the descending colon was removed completely with biopsy polypectomy. 2) A 3 mm polyp in the sigmoid colon was removed completely with cold snare polypectomy. 3) A 4 mm polyp in the sigmoid colon was removed completely with cold snare polypectomy. 4) Mild sigmoid colon diverticulosis was noted. The scope was then withdrawn into the rectum and retroflexed. The retroflexed view of the anal verge and rectum demonstrates moderate internal hemorrhoids. The scope was straightened, the colon was decompressed and the scope was withdrawn from the patient. The patient tolerated the procedure well and was taken to the PACU in good condition. Estimated blood loss: <5ml    ID Type Source Tests Collected by Time Destination   A : a. bx descending colon polyp/ snare sigmoid polyp Tissue Colon SURGICAL PATHOLOGY Naz Tracy DO 1/12/2022 0900          Impression:  See post-procedure diagnoses. Recommendations:  Await pathology. The patient had colon polyp(s) successfully removed today. China Shini is a small risk for these sites to bleed for up to several weeks out from the procedure. The patient is instructed to call if there is any significant amounts of  rectal bleeding, abdominal pain, dizziness, or other complaints thought to be related to the procedure. The patient is recommended to have a repeat colonoscopy in 5 or 10 years. This will be determined after the biopsies of the colon polyps are reviewed.      Naz Castellano DO  [facility] [place]  1/12/2022  [phone]

## 2022-01-12 NOTE — H&P
Pre-operative History and Physical    Patient: Estefany York  : 1970  Acct#:     Intended Procedure:  Colonoscopy    HISTORY OF PRESENT ILLNESS:  The patient is a 45 y.o. female  who presents for/due to Screening colonoscopy- Average risk       Past Medical History:        Diagnosis Date    HTN (hypertension)     IBS (irritable bowel syndrome)     Lactose intolerance      Past Surgical History:        Procedure Laterality Date    COLONOSCOPY      CYST REMOVAL      LADAN AND BSO  10/27/2016    with lysis of adhesions; Dr Valiente Nandini     Medications Prior to Admission:   No current facility-administered medications on file prior to encounter. Current Outpatient Medications on File Prior to Encounter   Medication Sig Dispense Refill    amLODIPine (NORVASC) 5 MG tablet TAKE ONE TABLET BY MOUTH DAILY 90 tablet 1    fexofenadine (ALLEGRA ALLERGY) 180 MG tablet Take 1 tablet by mouth daily (Patient taking differently: Take 180 mg by mouth as needed ) 90 tablet 1    fluticasone (FLONASE) 50 MCG/ACT nasal spray 2 sprays by Each Nostril route daily (Patient taking differently: 2 sprays by Each Nostril route as needed ) 3 Bottle 1        Allergies:  Oxycodone-acetaminophen    Social History:   TOBACCO:   reports that she has never smoked. She has never used smokeless tobacco.  ETOH:   reports current alcohol use. DRUGS:   reports no history of drug use. PHYSICAL EXAM:      Vital Signs: BP (!) 151/87   Pulse 73   Temp 97 °F (36.1 °C) (Temporal)   Resp 16   Ht 5' 8\" (1.727 m)   Wt 264 lb 8 oz (120 kg)   SpO2 100%   BMI 40.22 kg/m²    Airway: No stridor or wheezing noted. Good air movement  Pulmonary: without wheezes.   Clear to auscultation  Cardiac:regular rate and rhythm without loud murmurs  Abdomen:soft, nontender,  Bowel sounds present    Pre-Procedure Assessment / Plan:  1) Screening colonoscopy- Average risk     ASA Grade:  ASA 3 - Patient with moderate systemic disease with functional limitations  Mallampati Classification:  Class II    Level of Sedation Plan:Deep sedation    Post Procedure plan: Return to same level of care    I assessed the patient and find that the patient is in satisfactory condition to proceed with the planned procedure and sedation plan. I have explained the risk, benefits, and alternatives to the procedure; the patient understands and agrees to proceed. The patient was counseled at length about the risks of dennys Covid-19 during their perioperative period and any recovery window from their procedure. The patient was made aware that dennys Covid-19  may worsen their prognosis for recovering from their procedure  and lend to a higher morbidity and/or mortality risk. All material risks, benefits, and reasonable alternatives including postponing the procedure were discussed. The patient does wish to proceed with the procedure at this time.       Abdelrahman Ruiz DO  1/12/2022

## 2022-03-10 ENCOUNTER — HOSPITAL ENCOUNTER (EMERGENCY)
Age: 52
Discharge: HOME | End: 2022-03-10
Payer: COMMERCIAL

## 2022-03-10 ENCOUNTER — APPOINTMENT (OUTPATIENT)
Dept: GENERAL RADIOLOGY | Age: 52
End: 2022-03-10
Payer: COMMERCIAL

## 2022-03-10 VITALS
BODY MASS INDEX: 40.42 KG/M2 | WEIGHT: 272.93 LBS | HEART RATE: 77 BPM | DIASTOLIC BLOOD PRESSURE: 90 MMHG | RESPIRATION RATE: 16 BRPM | OXYGEN SATURATION: 98 % | TEMPERATURE: 98.6 F | HEIGHT: 69 IN | SYSTOLIC BLOOD PRESSURE: 159 MMHG

## 2022-03-10 DIAGNOSIS — S63.610A SPRAIN OF RIGHT INDEX FINGER, UNSPECIFIED SITE OF DIGIT, INITIAL ENCOUNTER: ICD-10-CM

## 2022-03-10 DIAGNOSIS — S63.501A SPRAIN OF RIGHT WRIST, INITIAL ENCOUNTER: Primary | ICD-10-CM

## 2022-03-10 PROCEDURE — 99284 EMERGENCY DEPT VISIT MOD MDM: CPT

## 2022-03-10 PROCEDURE — 73130 X-RAY EXAM OF HAND: CPT

## 2022-03-10 PROCEDURE — 73110 X-RAY EXAM OF WRIST: CPT

## 2022-03-10 PROCEDURE — 6370000000 HC RX 637 (ALT 250 FOR IP): Performed by: PHYSICIAN ASSISTANT

## 2022-03-10 RX ORDER — METHOCARBAMOL 500 MG/1
500 TABLET, FILM COATED ORAL 4 TIMES DAILY
Qty: 40 TABLET | Refills: 0 | Status: SHIPPED | OUTPATIENT
Start: 2022-03-10 | End: 2022-03-20

## 2022-03-10 RX ORDER — LIDOCAINE 50 MG/G
1 PATCH TOPICAL DAILY
Qty: 15 PATCH | Refills: 0 | Status: SHIPPED | OUTPATIENT
Start: 2022-03-10 | End: 2022-04-01 | Stop reason: ALTCHOICE

## 2022-03-10 RX ORDER — ACETAMINOPHEN 325 MG/1
650 TABLET ORAL ONCE
Status: COMPLETED | OUTPATIENT
Start: 2022-03-10 | End: 2022-03-10

## 2022-03-10 RX ORDER — ACETAMINOPHEN 325 MG/1
650 TABLET ORAL EVERY 6 HOURS PRN
Qty: 60 TABLET | Refills: 0 | Status: SHIPPED | OUTPATIENT
Start: 2022-03-10 | End: 2022-05-06 | Stop reason: ALTCHOICE

## 2022-03-10 RX ORDER — IBUPROFEN 400 MG/1
800 TABLET ORAL ONCE
Status: COMPLETED | OUTPATIENT
Start: 2022-03-10 | End: 2022-03-10

## 2022-03-10 RX ORDER — IBUPROFEN 800 MG/1
800 TABLET ORAL EVERY 8 HOURS PRN
Qty: 20 TABLET | Refills: 0 | Status: SHIPPED | OUTPATIENT
Start: 2022-03-10 | End: 2022-04-01 | Stop reason: ALTCHOICE

## 2022-03-10 RX ORDER — LIDOCAINE 4 G/G
1 PATCH TOPICAL DAILY
Status: DISCONTINUED | OUTPATIENT
Start: 2022-03-10 | End: 2022-03-10 | Stop reason: HOSPADM

## 2022-03-10 RX ADMIN — IBUPROFEN 800 MG: 400 TABLET, FILM COATED ORAL at 16:28

## 2022-03-10 RX ADMIN — ACETAMINOPHEN 650 MG: 325 TABLET ORAL at 16:28

## 2022-03-10 ASSESSMENT — PAIN SCALES - GENERAL
PAINLEVEL_OUTOF10: 3
PAINLEVEL_OUTOF10: 10

## 2022-03-10 ASSESSMENT — ENCOUNTER SYMPTOMS
COUGH: 0
VOMITING: 0
BACK PAIN: 1
COLOR CHANGE: 0
NAUSEA: 0
SHORTNESS OF BREATH: 0
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
## 2022-03-10 NOTE — Clinical Note
David Moore was seen and treated in our emergency department on 3/10/2022. She may return to work on 03/12/2022. If you have any questions or concerns, please don't hesitate to call.       Carrol Pro

## 2022-03-14 ENCOUNTER — TELEPHONE (OUTPATIENT)
Dept: FAMILY MEDICINE CLINIC | Age: 52
End: 2022-03-14
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
## 2022-03-14 NOTE — TELEPHONE ENCOUNTER
Patient seen in [facility] ER 3/10/22. Given referral to Dr. Sulaiman Miller for wrist.  Can you give a referral without seeing her, since she was seen at ER? Or need to see her?

## 2022-03-15 ENCOUNTER — OFFICE VISIT (OUTPATIENT)
Dept: ORTHOPEDIC SURGERY | Age: 52
End: 2022-03-15
Payer: COMMERCIAL

## 2022-03-15 VITALS — WEIGHT: 272 LBS | HEIGHT: 69 IN | BODY MASS INDEX: 40.29 KG/M2

## 2022-03-15 DIAGNOSIS — M79.644 PAIN OF FINGER OF RIGHT HAND: Primary | ICD-10-CM

## 2022-03-15 DIAGNOSIS — S63.658A: ICD-10-CM

## 2022-03-15 PROCEDURE — 99213 OFFICE O/P EST LOW 20 MIN: CPT | Performed by: PHYSICIAN ASSISTANT

## 2022-03-15 PROCEDURE — L3809 WHFO W/O JOINTS PRE OTS: HCPCS | Performed by: PHYSICIAN ASSISTANT

## 2022-03-28 ENCOUNTER — OFFICE VISIT (OUTPATIENT)
Dept: ORTHOPEDIC SURGERY | Age: 52
End: 2022-03-28
Payer: COMMERCIAL

## 2022-03-28 VITALS — RESPIRATION RATE: 18 BRPM | BODY MASS INDEX: 40.29 KG/M2 | WEIGHT: 272 LBS | HEIGHT: 69 IN

## 2022-03-28 DIAGNOSIS — S63.658A: Primary | ICD-10-CM

## 2022-03-28 PROCEDURE — 99213 OFFICE O/P EST LOW 20 MIN: CPT | Performed by: PHYSICIAN ASSISTANT
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
## 2022-04-01 ENCOUNTER — OFFICE VISIT (OUTPATIENT)
Dept: FAMILY MEDICINE CLINIC | Age: 52
End: 2022-04-01
Payer: COMMERCIAL

## 2022-04-01 VITALS
WEIGHT: 272 LBS | SYSTOLIC BLOOD PRESSURE: 148 MMHG | OXYGEN SATURATION: 99 % | HEART RATE: 86 BPM | DIASTOLIC BLOOD PRESSURE: 96 MMHG | BODY MASS INDEX: 40.29 KG/M2 | HEIGHT: 69 IN

## 2022-04-01 DIAGNOSIS — Z13.220 SCREENING, LIPID: ICD-10-CM

## 2022-04-01 DIAGNOSIS — M54.41 CHRONIC BILATERAL LOW BACK PAIN WITH BILATERAL SCIATICA: ICD-10-CM

## 2022-04-01 DIAGNOSIS — M54.42 CHRONIC BILATERAL LOW BACK PAIN WITH BILATERAL SCIATICA: ICD-10-CM

## 2022-04-01 DIAGNOSIS — G89.29 CHRONIC BILATERAL LOW BACK PAIN WITH BILATERAL SCIATICA: ICD-10-CM

## 2022-04-01 DIAGNOSIS — R73.03 PREDIABETES: ICD-10-CM

## 2022-04-01 DIAGNOSIS — R63.5 WEIGHT GAIN, ABNORMAL: ICD-10-CM

## 2022-04-01 DIAGNOSIS — I10 ESSENTIAL HYPERTENSION: Primary | ICD-10-CM

## 2022-04-01 PROCEDURE — 99214 OFFICE O/P EST MOD 30 MIN: CPT | Performed by: FAMILY MEDICINE

## 2022-04-01 RX ORDER — AMLODIPINE BESYLATE AND BENAZEPRIL HYDROCHLORIDE 5; 10 MG/1; MG/1
1 CAPSULE ORAL DAILY
Qty: 30 CAPSULE | Refills: 3 | Status: SHIPPED | OUTPATIENT
Start: 2022-04-01 | End: 2022-06-03

## 2022-04-01 ASSESSMENT — PATIENT HEALTH QUESTIONNAIRE - PHQ9
SUM OF ALL RESPONSES TO PHQ QUESTIONS 1-9: 0
SUM OF ALL RESPONSES TO PHQ9 QUESTIONS 1 & 2: 0
2. FEELING DOWN, DEPRESSED OR HOPELESS: 0
1. LITTLE INTEREST OR PLEASURE IN DOING THINGS: 0
SUM OF ALL RESPONSES TO PHQ QUESTIONS 1-9: 0
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
## 2022-04-05 ENCOUNTER — TELEPHONE (OUTPATIENT)
Dept: ORTHOPEDIC SURGERY | Age: 52
End: 2022-04-05

## 2022-04-05 NOTE — TELEPHONE ENCOUNTER
Attempted to contact patient twice to get an appointment set up with a back and spine specialist. LVM with the back and spine call center for patient to callback at their convenience. [phone].

## 2022-04-12 DIAGNOSIS — I10 ESSENTIAL HYPERTENSION: ICD-10-CM

## 2022-04-12 DIAGNOSIS — Z13.220 SCREENING, LIPID: ICD-10-CM

## 2022-04-12 DIAGNOSIS — R73.03 PREDIABETES: ICD-10-CM

## 2022-04-12 DIAGNOSIS — R63.5 WEIGHT GAIN, ABNORMAL: ICD-10-CM

## 2022-04-12 LAB
A/G RATIO: 1.4 (ref 1.1–2.2)
ALBUMIN SERPL-MCNC: 4 G/DL (ref 3.4–5)
ALP BLD-CCNC: 176 U/L (ref 40–129)
ALT SERPL-CCNC: 20 U/L (ref 10–40)
ANION GAP SERPL CALCULATED.3IONS-SCNC: 13 MMOL/L (ref 3–16)
AST SERPL-CCNC: 20 U/L (ref 15–37)
BILIRUB SERPL-MCNC: 0.5 MG/DL (ref 0–1)
BUN BLDV-MCNC: 9 MG/DL (ref 7–20)
CALCIUM SERPL-MCNC: 9.8 MG/DL (ref 8.3–10.6)
CHLORIDE BLD-SCNC: 108 MMOL/L (ref 99–110)
CHOLESTEROL, TOTAL: 186 MG/DL (ref 0–199)
CO2: 22 MMOL/L (ref 21–32)
CORTISOL - AM: 9.7 UG/DL (ref 4.3–22.4)
CREAT SERPL-MCNC: 0.9 MG/DL (ref 0.6–1.1)
GFR AFRICAN AMERICAN: >60
GFR NON-AFRICAN AMERICAN: >60
GLUCOSE BLD-MCNC: 111 MG/DL (ref 70–99)
HDLC SERPL-MCNC: 49 MG/DL (ref 40–60)
LDL CHOLESTEROL CALCULATED: 117 MG/DL
POTASSIUM SERPL-SCNC: 4.2 MMOL/L (ref 3.5–5.1)
SODIUM BLD-SCNC: 143 MMOL/L (ref 136–145)
TOTAL PROTEIN: 6.9 G/DL (ref 6.4–8.2)
TRIGL SERPL-MCNC: 102 MG/DL (ref 0–150)
TSH SERPL DL<=0.05 MIU/L-ACNC: 1.16 UIU/ML (ref 0.27–4.2)
VLDLC SERPL CALC-MCNC: 20 MG/DL

## 2022-04-13 DIAGNOSIS — R74.8 ALKALINE PHOSPHATASE ELEVATION: Primary | ICD-10-CM

## 2022-04-13 LAB
ESTIMATED AVERAGE GLUCOSE: 125.5 MG/DL
HBA1C MFR BLD: 6 %

## 2022-04-25 ENCOUNTER — HOSPITAL ENCOUNTER (OUTPATIENT)
Dept: ULTRASOUND IMAGING | Age: 52
Discharge: HOME | End: 2022-04-25
Payer: COMMERCIAL

## 2022-04-25 DIAGNOSIS — R74.8 ALKALINE PHOSPHATASE ELEVATION: ICD-10-CM

## 2022-04-25 PROBLEM — K76.0 NAFLD (NONALCOHOLIC FATTY LIVER DISEASE): Status: ACTIVE | Noted: 2022-04-25

## 2022-04-25 PROCEDURE — 76705 ECHO EXAM OF ABDOMEN: CPT

## 2022-05-06 ENCOUNTER — OFFICE VISIT (OUTPATIENT)
Dept: FAMILY MEDICINE CLINIC | Age: 52
End: 2022-05-06
Payer: COMMERCIAL

## 2022-05-06 VITALS
HEIGHT: 69 IN | DIASTOLIC BLOOD PRESSURE: 94 MMHG | HEART RATE: 69 BPM | SYSTOLIC BLOOD PRESSURE: 160 MMHG | OXYGEN SATURATION: 100 % | WEIGHT: 270 LBS | BODY MASS INDEX: 39.99 KG/M2

## 2022-05-06 DIAGNOSIS — I10 ESSENTIAL HYPERTENSION: Primary | ICD-10-CM

## 2022-05-06 DIAGNOSIS — Z12.31 SCREENING MAMMOGRAM FOR BREAST CANCER: ICD-10-CM

## 2022-05-06 DIAGNOSIS — K76.0 NAFLD (NONALCOHOLIC FATTY LIVER DISEASE): ICD-10-CM

## 2022-05-06 DIAGNOSIS — R51.9 DAILY HEADACHE: ICD-10-CM

## 2022-05-06 DIAGNOSIS — R73.03 PREDIABETES: ICD-10-CM

## 2022-05-06 PROCEDURE — 99214 OFFICE O/P EST MOD 30 MIN: CPT | Performed by: FAMILY MEDICINE

## 2022-05-06 RX ORDER — AMLODIPINE AND VALSARTAN 5; 160 MG/1; MG/1
1 TABLET ORAL DAILY
Qty: 30 TABLET | Refills: 3 | Status: SHIPPED | OUTPATIENT
Start: 2022-05-06

## 2022-05-06 NOTE — PROGRESS NOTES
2022    Blood pressure (!) 160/94, pulse 69, height 5' 8.5\" (1.74 m), weight 270 lb (122.5 kg), SpO2 100 %, not currently breastfeeding. Bharath Elizondo (:  1970) is a 45 y.o. female, here for evaluation of the following medical concerns:    Chief Complaint   Patient presents with    Hypertension     f/u BP check    Headache     headache every day for the past 2 weeks     Here for routine follow up of HTN. Her bp has been high all week she says. High all year, actually. Last visit we changed norvasc to lotrel 5-10. bp is not really changed. She feels 'weird' since starting lotrel- perhaps more lightheaded. Headaches are more frequent. Last renal function test: was done on lotrel (normal potassium, renal fxn). Lab Results   Component Value Date     2022    K 4.2 2022    K 3.5 2021    BUN 9 2022    CREATININE 0.9 2022     Estimated Creatinine Clearance: 102 mL/min (based on SCr of 0.9 mg/dL). She says her headaches are in the mornings, does not wake with them, develop on the way to work (moreso after she arrives at work). She is a  at a [employer] [employer]; she does like her job, does not feel stressed. She uses a computer all day. Pain can be occipital, frontal- often both at the same time. Headaches are daily now. No n/v. No photo/phonophobia. No hx migraines. She takes Ibu when she gets headaches- nearly every day. Tends not to get headaches on weekends. Last eye exam a year ago. Has appt for eye exam next week. No chest pains, dizziness, heart palpitations, dyspnea, lightheadedness, worsening edema. She has prediabetes and fatty liver disease. Interested in meds to improve her health     Patient Active Problem List   Diagnosis    Essential hypertension    H/O colonoscopy  (for IBS). benign polyps removed.     Sprain of anterior talofibular ligament of left ankle    Menopausal symptoms    Class 2 severe obesity due to excess calories with serious comorbidity and body mass index (BMI) of 38.0 to 38.9 in adult [facility])    Irritable bowel syndrome with diarrhea    Idiopathic thrombocytopenic purpura (HCC) mild, sees Dr Hoa Lund    Plantar fasciitis, left    NAFLD (nonalcoholic fatty liver disease) sono 4/22        Body mass index is 40.46 kg/m². Wt Readings from Last 3 Encounters:   05/06/22 270 lb (122.5 kg)   04/01/22 272 lb (123.4 kg)   03/28/22 272 lb (123.4 kg)       BP Readings from Last 3 Encounters:   05/06/22 (!) 160/94   04/01/22 (!) 148/96   03/10/22 (!) 159/90       Allergies   Allergen Reactions    Oxycodone-Acetaminophen Itching       Prior to Visit Medications    Medication Sig Taking? Authorizing Provider   amLODIPine-benazepril (LOTREL) 5-10 MG per capsule Take 1 capsule by mouth daily Yes Angelia Bruno MD   fexofenadine [facility] ALLERGY) 180 MG tablet Take 1 tablet by mouth daily  Patient not taking: Reported on 5/6/2022  Angelia Bruno MD   fluticasone Tania Pond) 50 MCG/ACT nasal spray 2 sprays by Each Nostril route daily  Patient not taking: Reported on 5/6/2022  Angelia Bruno MD        Social History     Tobacco Use    Smoking status: Never Smoker    Smokeless tobacco: Never Used   Vaping Use    Vaping Use: Never used   Substance Use Topics    Alcohol use: Yes     Alcohol/week: 0.0 standard drinks     Comment: socially    Drug use: No       Review of Systems As above     Physical Exam  Vitals and nursing note reviewed. Constitutional:       Appearance: Normal appearance. She is well-developed. She is obese. Neck:      Thyroid: No thyromegaly. Cardiovascular:      Rate and Rhythm: Normal rate and regular rhythm. Pulses: Normal pulses. Heart sounds: Normal heart sounds. No murmur heard. Pulmonary:      Effort: Pulmonary effort is normal. No respiratory distress. Breath sounds: Normal breath sounds. No wheezing or rales.    Musculoskeletal: General: Normal range of motion. Cervical back: Normal range of motion. Skin:     General: Skin is warm and dry. Neurological:      General: No focal deficit present. Mental Status: She is alert and oriented to person, place, and time. Mental status is at baseline. Psychiatric:         Mood and Affect: Mood normal.         Behavior: Behavior normal.         Thought Content: Thought content normal.         Judgment: Judgment normal.         ASSESSMENT/PLAN:    1. Essential hypertension  - bp still high. She is opposed to higher doses of lotrel ?side effects  - change to exforge 160-5.  - try to avoid nsaid use  - advised to monitor at home (omron 3 meter)  Recheck 4 wks. 2. Daily headache  - temporally related to computer use. Likely eye strain. Luckily, she is going to see eye doctor next week. Can try buying readers on her own. 3. NAFLD (nonalcoholic fatty liver disease) sono 4/22  - consider GLP-1 meds    4. Prediabetes  - consider GLP-1 (ozempic) for this. Return in about 4 weeks (around 6/3/2022). An  Stackdriverignature was used to authenticate this note.     --Trudy Lara MD on 5/6/2022 at 10:57 AM

## 2022-05-19 ENCOUNTER — HOSPITAL ENCOUNTER (OUTPATIENT)
Dept: GENERAL RADIOLOGY | Age: 52
Discharge: HOME | End: 2022-05-19
Payer: COMMERCIAL

## 2022-05-19 DIAGNOSIS — M79.644 FINGER PAIN, RIGHT: ICD-10-CM

## 2022-05-19 DIAGNOSIS — M25.531 RIGHT WRIST PAIN: ICD-10-CM

## 2022-05-19 PROCEDURE — 73140 X-RAY EXAM OF FINGER(S): CPT

## 2022-05-19 PROCEDURE — 73110 X-RAY EXAM OF WRIST: CPT

## 2022-06-03 ENCOUNTER — OFFICE VISIT (OUTPATIENT)
Dept: FAMILY MEDICINE CLINIC | Age: 52
End: 2022-06-03
Payer: COMMERCIAL

## 2022-06-03 VITALS
SYSTOLIC BLOOD PRESSURE: 130 MMHG | OXYGEN SATURATION: 99 % | BODY MASS INDEX: 39.1 KG/M2 | DIASTOLIC BLOOD PRESSURE: 84 MMHG | HEIGHT: 69 IN | HEART RATE: 84 BPM | WEIGHT: 264 LBS

## 2022-06-03 DIAGNOSIS — E66.01 CLASS 2 SEVERE OBESITY DUE TO EXCESS CALORIES WITH SERIOUS COMORBIDITY AND BODY MASS INDEX (BMI) OF 38.0 TO 38.9 IN ADULT (HCC): ICD-10-CM

## 2022-06-03 DIAGNOSIS — F40.243 FEAR OF FLYING: ICD-10-CM

## 2022-06-03 DIAGNOSIS — I10 ESSENTIAL HYPERTENSION: Primary | ICD-10-CM

## 2022-06-03 PROCEDURE — 99214 OFFICE O/P EST MOD 30 MIN: CPT | Performed by: FAMILY MEDICINE

## 2022-06-03 RX ORDER — LORAZEPAM 0.5 MG/1
TABLET ORAL
Qty: 4 TABLET | Refills: 0 | Status: SHIPPED | OUTPATIENT
Start: 2022-06-03 | End: 2022-07-04

## 2022-06-03 NOTE — PROGRESS NOTES
6/3/2022    Blood pressure 130/84, pulse 84, height 5' 8.5\" (1.74 m), weight 264 lb (119.7 kg), SpO2 99 %, not currently breastfeeding. Violeta Layton (:  1970) is a 45 y.o. female, here for evaluation of the following medical concerns:    Chief Complaint   Patient presents with    Hypertension     2 mo BP check     Here for f/u on HTN, headaches. Last visit 4 wks ago. reveiwed last visit's details together. Changed from Lotrel to exforge last month. BP better now, no more headaches, no problems with the new med. Her bp's at home- not tested. Saw eye doctor for headaches: no abnormality. Saw her . She did stop ibuprofen when we met last visit, HA's stopped shortly after that visit. Used tylenol 2x in past month for headaches. Does not feel like it was eye strain. No chest pains, dizziness, heart palpitations, dyspnea, lightheadedness, worsening edema. Last renal function test:   Lab Results   Component Value Date     2022    K 4.2 2022    K 3.5 2021    BUN 9 2022    CREATININE 0.9 2022     Estimated Creatinine Clearance: 100 mL/min (based on SCr of 0.9 mg/dL). She is no longer taking allergy meds. She down 8 lbs by not eating bread, sweets or snacks the past 10 days. She feels like this change is sustainable. Patient Active Problem List   Diagnosis    Essential hypertension    H/O colonoscopy  (for IBS). benign polyps removed.  Sprain of anterior talofibular ligament of left ankle    Menopausal symptoms    Class 2 severe obesity due to excess calories with serious comorbidity and body mass index (BMI) of 38.0 to 38.9 in adult [facility])    Irritable bowel syndrome with diarrhea    Idiopathic thrombocytopenic purpura (HCC) mild, sees Dr Mao Began Plantar fasciitis, left    NAFLD (nonalcoholic fatty liver disease) sono     Prediabetes        Body mass index is 39.56 kg/m².     Wt Readings from Last 3 Encounters:   06/03/22 264 lb (119.7 kg)   05/06/22 270 lb (122.5 kg)   04/01/22 272 lb (123.4 kg)       BP Readings from Last 3 Encounters:   06/03/22 130/84   05/06/22 (!) 160/94   04/01/22 (!) 148/96       Allergies   Allergen Reactions    Oxycodone-Acetaminophen Itching       Prior to Visit Medications    Medication Sig Taking? Authorizing Provider   amLODIPine-valsartan (EXFORGE) 5-160 MG per tablet Take 1 tablet by mouth daily Yes Aracelis Lund MD   fexofenadine TY [facility] ALLERGY) 180 MG tablet Take 1 tablet by mouth daily  Patient not taking: Reported on 5/6/2022  Aracelis Lund MD   fluticasone Jackquline Sees) 50 MCG/ACT nasal spray 2 sprays by Each Nostril route daily  Patient not taking: Reported on 5/6/2022  Aracelis Lund MD        Social History     Tobacco Use    Smoking status: Never Smoker    Smokeless tobacco: Never Used   Vaping Use    Vaping Use: Never used   Substance Use Topics    Alcohol use: Yes     Alcohol/week: 0.0 standard drinks     Comment: socially    Drug use: No       Review of Systems    Physical Exam  Vitals and nursing note reviewed. Constitutional:       Appearance: Normal appearance. She is well-developed. She is obese. Neck:      Thyroid: No thyromegaly. Cardiovascular:      Rate and Rhythm: Normal rate and regular rhythm. Pulses: Normal pulses. Heart sounds: Normal heart sounds. No murmur heard. Pulmonary:      Effort: Pulmonary effort is normal. No respiratory distress. Breath sounds: Normal breath sounds. No wheezing or rales. Musculoskeletal:         General: Normal range of motion. Cervical back: Normal range of motion. Skin:     General: Skin is warm and dry. Neurological:      General: No focal deficit present. Mental Status: She is alert and oriented to person, place, and time. Mental status is at baseline.    Psychiatric:         Mood and Affect: Mood normal.         Behavior: Behavior normal.         Thought Content: Thought content normal.         Judgment: Judgment normal.         ASSESSMENT/PLAN:    1. Essential hypertension  - Blood pressure is at goal on current therapy; continue meds and monitoring. Regular physical activity and healthy diet (low sodium, multiple servings of produce daily) was recommended. Renal function to be assessed yearly. 2. Class 2 severe obesity due to excess calories with serious comorbidity and body mass index (BMI) of 38.0 to 38.9 in adult [facility])  - Discussed diet and exercise at length today. Encouraged small but consistent changes to diet as well as exercise (such as adding 1 mile of walking daily). Discussed that a net decrease of 200 saw/day could result in ~20 lb wt loss over 12 months. Discussed ways to look at eating for opportunities for reducing calories. Encouraged ways to keep track of calories. 3. Fear of flying  - planning flight to [place] for a wedding; requests ativan for flying phobia. Has been effective in the past.  [place]ARRS report reviewed; is consistent with prescribed use and free of suspicious activity.   - LORazepam (ATIVAN) 0.5 MG tablet; 1 tab po 1 hr before boarding airplane. May repeat dose in an hour if needed. Dispense: 4 tablet; Refill: 0      Return in about 6 months (around 12/3/2022) for follow up HTN. An  [employer] was used to authenticate this note.     --Yvonne Chavez MD on 6/3/2022 at 3:59 PM

## 2022-06-03 NOTE — PATIENT INSTRUCTIONS
[facility]: [address] [facility] [address] Locations:   [address] [id number] [facility]., [address] [place], [address] [place] [address]., [place] [place] [address]   [address] [place] [address]

## 2022-08-24 SDOH — HEALTH STABILITY: PHYSICAL HEALTH: ON AVERAGE, HOW MANY MINUTES DO YOU ENGAGE IN EXERCISE AT THIS LEVEL?: 0 MIN

## 2022-08-24 SDOH — HEALTH STABILITY: PHYSICAL HEALTH: ON AVERAGE, HOW MANY DAYS PER WEEK DO YOU ENGAGE IN MODERATE TO STRENUOUS EXERCISE (LIKE A BRISK WALK)?: 0 DAYS

## 2022-08-25 ENCOUNTER — OFFICE VISIT (OUTPATIENT)
Dept: ORTHOPEDIC SURGERY | Age: 52
End: 2022-08-25
Payer: COMMERCIAL

## 2022-08-25 VITALS — RESPIRATION RATE: 18 BRPM | HEIGHT: 69 IN | WEIGHT: 264 LBS | BODY MASS INDEX: 39.1 KG/M2

## 2022-08-25 DIAGNOSIS — M54.50 LOW BACK PAIN, UNSPECIFIED BACK PAIN LATERALITY, UNSPECIFIED CHRONICITY, UNSPECIFIED WHETHER SCIATICA PRESENT: Primary | ICD-10-CM

## 2022-08-25 DIAGNOSIS — M51.36 DDD (DEGENERATIVE DISC DISEASE), LUMBAR: ICD-10-CM

## 2022-08-25 DIAGNOSIS — M48.062 SPINAL STENOSIS OF LUMBAR REGION WITH NEUROGENIC CLAUDICATION: ICD-10-CM

## 2022-08-25 PROBLEM — M51.369 DDD (DEGENERATIVE DISC DISEASE), LUMBAR: Status: ACTIVE | Noted: 2022-08-25

## 2022-08-25 PROCEDURE — 99214 OFFICE O/P EST MOD 30 MIN: CPT | Performed by: PHYSICIAN ASSISTANT

## 2022-08-25 RX ORDER — METHYLPREDNISOLONE 4 MG/1
TABLET ORAL
Qty: 1 KIT | Refills: 0 | Status: SHIPPED | OUTPATIENT
Start: 2022-08-25

## 2022-08-25 NOTE — PROGRESS NOTES
History of present illness:   Ms. Zoie Gifford is a pleasant 45 y.o. female kindly referred by Jorge Armstrong MD for consultation regarding her low back pain and bilateral leg pain. Her pain began approximately 1 year ago. Pain has steadily worsened since onset. Back pain 10/10 VAS, right and left buttock pain 10/10 VAS, right and left leg pain 10/10 VAS. Pain is describes as aching, throbbing. She denies numbness and tingling left or right lower extremity. She denies weakness of her right and left leg. She denies bowel or bladder dysfunction and saddle anesthesia. She can sit for a maximum of hours and stand for a maximum 5-10 minutes. Pain does disrupt her sleep. Prior medications and treatment tried include Robaxin and over-the-counter ibuprofen without relief. Past medical history:  Her past medical history has been reviewed. Past Medical History:   Diagnosis Date    DDD (degenerative disc disease), lumbar 8/25/2022    HTN (hypertension)     IBS (irritable bowel syndrome)     Lactose intolerance     NAFLD (nonalcoholic fatty liver disease) sono 4/22 4/25/2022       Her past surgical history has been reviewed. Past Surgical History:   Procedure Laterality Date    COLONOSCOPY      COLONOSCOPY N/A 1/12/2022    COLONOSCOPY POLYPECTOMY SNARE/COLD BIOPSY performed by Kennedi Zhao DO at [address] [facility] (CERVIX REMOVED)  10/27/2016    with lysis of adhesions; Dr Palmer Gave         Her medications and allergies were reviewed. Current Outpatient Medications   Medication Sig Dispense Refill    methylPREDNISolone (MEDROL, BRENDA,) 4 MG tablet Take by mouth.  6 po day one 5 po day 2 4 po day 3 3 po day 4 2 po day 5 1 po day 6. 1 kit 0    amLODIPine-valsartan (EXFORGE) 5-160 MG per tablet Take 1 tablet by mouth daily 30 tablet 3    fexofenadine (ALLEGRA ALLERGY) 180 MG tablet Take 1 tablet by mouth daily (Patient not taking: Reported on 5/6/2022) 90 tablet 1    fluticasone (FLONASE) 50 MCG/ACT nasal spray 2 sprays by Each Nostril route daily (Patient not taking: Reported on 5/6/2022) 3 Bottle 1     No current facility-administered medications for this visit. Her social history has been reviewed. Social History     Occupational History    Occupation: call      Comment: [employer]   Tobacco Use    Smoking status: Never    Smokeless tobacco: Never   Vaping Use    Vaping Use: Never used   Substance and Sexual Activity    Alcohol use: Yes     Alcohol/week: 0.0 standard drinks     Comment: socially    Drug use: No    Sexual activity: Yes     Partners: Male         Her family history has been reviewed. Family History   Problem Relation Age of Onset    High Blood Pressure Mother     Dementia Mother     Heart Disease Father         CAD    High Blood Pressure Sister     Diabetes Sister          Review of Systems:  I have reviewed the clinically relevant past medical history, medications, allergies, family history, social history, and 13 point Review of Systems from the patient's recent history form & documented any details relevant to today's presenting complaints in the history above. The patient's self-reported past medical history, medications, allergies, family history, social history, and Review of Systems and spine forms from today's date have been scanned into the chart under the \"Media\" tab. Review of symptoms was reviewed and is significant for back pain and negative for recent weight loss, fatigue, chills, visual disturbances, blood in stool or urine, recent infection. Physical examination:  Ms. Sahtish Small's most recent vitals:  Vitals  Resp: 18  Height: 5' 8.5\" (174 cm)  Weight: 264 lb (119.7 kg)  Body mass index is 39.56 kg/m². General exam:  She is well-developed and well-nourished, is in obvious discomfort and alert and oriented to person, place, and time.    She demonstrates appropriate mood and affect. Her skin is warm and dry. Her gait is normal and she walks heel to toe without significant limp or instability. Back:  She stands with slight lumbar flexion. Her lumbar flexion, extension and lateral bending are mildly reduced with pain. She has mild tenderness over her lumbar spine without obvious muscle spasm. The skin over her lumbar spine is normal without a surgical scar. Lower extremities:  She has 5/5 motor strength of bilateral lower extremities. She has a negative straight leg raise, bilaterally. Deep tendon reflexes: 2+  Sensation is intact to light touch L3 to S1 bilaterally. She has no clonus. Hip range of motion is normal.  Stinchfield test is negative. Imaging:  X rays AP and lateral lumbar spine obtained in the office today. X-rays show moderate degenerative disc disease L5-S1 without spondylolisthesis. Diagnosis:      ICD-10-CM    1. Low back pain, unspecified back pain laterality, unspecified chronicity, unspecified whether sciatica present  M54.50 XR LUMBAR SPINE (2-3 VIEWS)      2. DDD (degenerative disc disease), lumbar  M51.36       3. Spinal stenosis of lumbar region with neurogenic claudication  M48.062            Assessment/ Plan:    Low back pain and leg pain felt to be related to degenerative disc disease, facet arthritis and probable foraminal stenosis L5-S1. Neurologically intact in both lower extremities. I had an extensive discussion with Ms. Vasile Bates and/or family regarding the natural history, etiology, and long term consequences of her condition. I have presented reasonable alternatives to the patient's proposed care, treatment, and services. Risks and benefits of the treatment options also reviewed in detail. I have outlined a treatment plan with them. She has had full opportunity to ask her questions. I have answered them all to her satisfaction. I feel that Ms. Vasile Bates understands our discussion today.      New Medications prescribed today:  Medrol Dosepak    PT:  A home exercise program was instructed today including ROM exercises and strengthening exercises. The patient verbalized understanding of these exercises as well as the importance of the exercise program to promote return of normal function. If pain intensifies or other problems arise you are to notify the office. Follow up:   2-3 weeks. Consider MRI lumbar spine if symptoms fail to improve with conservative treatment. She was instructed to call us emergently if she begins to experience bowel or bladder dysfunction, saddle anesthesia, increasing muscle weakness, or onset/ worsening leg symptoms. The total time spent on today's visit including reviewing test results, history, performance of physical exam, counseling/ education, ordering of medications, tests or procedures was 35 minutes. This time does include completion of the medical record. This time excludes any time spent performing procedures or tests in the office. Francisco Santoyo PA-C   Senior Physician Assistant   [facility]                                         Disclaimer: This note was generated with use of a verbal recognition program (DRAGON) and an attempt was made to check for errors. It is possible that there are still dictated errors within this office note. If so, please bring any significant errors to my attention for an addendum. All efforts were made to ensure that this office note is accurate.

## 2022-09-06 ENCOUNTER — OFFICE VISIT (OUTPATIENT)
Dept: ORTHOPEDIC SURGERY | Age: 52
End: 2022-09-06
Payer: COMMERCIAL

## 2022-09-06 VITALS — WEIGHT: 264 LBS | BODY MASS INDEX: 39.1 KG/M2 | RESPIRATION RATE: 16 BRPM | HEIGHT: 69 IN

## 2022-09-06 DIAGNOSIS — M48.062 SPINAL STENOSIS OF LUMBAR REGION WITH NEUROGENIC CLAUDICATION: ICD-10-CM

## 2022-09-06 DIAGNOSIS — M51.36 DDD (DEGENERATIVE DISC DISEASE), LUMBAR: Primary | ICD-10-CM

## 2022-09-06 PROCEDURE — 99213 OFFICE O/P EST LOW 20 MIN: CPT | Performed by: PHYSICIAN ASSISTANT

## 2022-09-06 RX ORDER — DIAZEPAM 10 MG/1
10 TABLET ORAL PRN
Qty: 2 TABLET | Refills: 0 | Status: SHIPPED | OUTPATIENT
Start: 2022-09-06 | End: 2022-10-06

## 2022-09-07 NOTE — PROGRESS NOTES
Subjective:      Patient ID: Evangelista Patel is a 45 y.o. female who is here for follow up evaluation of her low back pain and bilateral leg pain. Back pain 10/10 VAS, right and left buttock pain 10/10 VAS, right and left leg pain 10/10 VAS. Pain is describes as aching, throbbing. She denies numbness and tingling left or right lower extremity. She denies weakness of her right and left leg. She denies bowel or bladder dysfunction and saddle anesthesia. She can sit for a maximum of hours and stand for a maximum 5-10 minutes. Pain does disrupt her sleep. Prior medications and treatment tried include Robaxin and over-the-counter ibuprofen without relief. More recently she was prescribed a Medrol Dosepak with minimal to no improvement. Physical therapy attempted without any improvement. Review Of Systems:   Review of symptoms was reviewed and is significant for back pain and negative for recent weight loss, fatigue, chills, visual disturbances, blood in stool or urine, recent infection.      Past Medical History:   Diagnosis Date    DDD (degenerative disc disease), lumbar 8/25/2022    HTN (hypertension)     IBS (irritable bowel syndrome)     Lactose intolerance     NAFLD (nonalcoholic fatty liver disease) sono 4/22 4/25/2022       Family History   Problem Relation Age of Onset    High Blood Pressure Mother     Dementia Mother     Heart Disease Father         CAD    High Blood Pressure Sister     Diabetes Sister        Past Surgical History:   Procedure Laterality Date    COLONOSCOPY      COLONOSCOPY N/A 1/12/2022    COLONOSCOPY POLYPECTOMY SNARE/COLD BIOPSY performed by Paulo Holder DO at [address] [facility] (CERVIX REMOVED)  10/27/2016    with lysis of adhesions; Dr Rocio Vincent       Social History     Occupational History    Occupation: call      Comment: [employer]   Tobacco Use    Smoking status: Never    Smokeless tobacco: Never Vaping Use    Vaping Use: Never used   Substance and Sexual Activity    Alcohol use: Yes     Alcohol/week: 0.0 standard drinks     Comment: socially    Drug use: No    Sexual activity: Yes     Partners: Male       Current Outpatient Medications   Medication Sig Dispense Refill    diazePAM (VALIUM) 10 MG tablet Take 1 tablet by mouth as needed for Anxiety (may take 45 mins prior to MRI. May repeat x 1 as needed.) for up to 2 doses. 2 tablet 0    methylPREDNISolone (MEDROL, BRENDA,) 4 MG tablet Take by mouth. 6 po day one 5 po day 2 4 po day 3 3 po day 4 2 po day 5 1 po day 6. 1 kit 0    amLODIPine-valsartan (EXFORGE) 5-160 MG per tablet Take 1 tablet by mouth daily 30 tablet 3    fexofenadine (ALLEGRA ALLERGY) 180 MG tablet Take 1 tablet by mouth daily (Patient not taking: Reported on 5/6/2022) 90 tablet 1    fluticasone (FLONASE) 50 MCG/ACT nasal spray 2 sprays by Each Nostril route daily (Patient not taking: Reported on 5/6/2022) 3 Bottle 1     No current facility-administered medications for this visit. Objective:     She is alert, oriented x 3, pleasant, well nourished, developed and in no   acute distress. Resp 16   Ht 5' 8.5\" (1.74 m)   Wt 264 lb (119.7 kg)   BMI 39.56 kg/m²      General exam:  She is well-developed and well-nourished, is in obvious discomfort and alert and oriented to person, place, and time. She demonstrates appropriate mood and affect. Her skin is warm and dry. Her gait is normal and she walks heel to toe without significant limp or instability. Back:  She stands with slight lumbar flexion. Her lumbar flexion, extension and lateral bending are mildly reduced with pain. She has mild tenderness over her lumbar spine without obvious muscle spasm. The skin over her lumbar spine is normal without a surgical scar. Lower extremities:  She has 5/5 motor strength of bilateral lower extremities. She has a negative straight leg raise, bilaterally.     Deep tendon reflexes: 2+  Sensation is intact to light touch L3 to S1 bilaterally. She has no clonus. Hip range of motion is normal.  Stinchfield test is negative. X Rays: not performed in the office today:   Previous lumbar spine x-rays 8/25/2022 shows moderate degenerative disc disease L5-S1 without evidence of a spondylolisthesis. Diagnosis:       ICD-10-CM    1. DDD (degenerative disc disease), lumbar  M51.36 MRI LUMBAR SPINE WO CONTRAST     diazePAM (VALIUM) 10 MG tablet      2. Spinal stenosis of lumbar region with neurogenic claudication  M48.062 MRI LUMBAR SPINE WO CONTRAST     diazePAM (VALIUM) 10 MG tablet           Assessment and Plan:       Assessment:  Low back pain and lumbar radicular pain affecting bilateral lower extremities most likely due to foraminal or canal stenosis. She has failed to improve with oral steroids, anti-inflammatory medications and physical therapy. I had an extensive discussion with Ms. Blank Ann regarding the natural history, etiology, and long term consequences of her condition. I have presented reasonable alternatives to the patient's proposed care, treatment, and services. Risks and benefits of the treatment options also reviewed in detail. I have outlined a treatment plan with them. She has had full opportunity to ask her questions. I have answered them all to her satisfaction. I feel that Ms. Blank Ann understands our discussion today. Plan:  Medications-   OTC NSAIDS discussed. The most common side effects from NSAIDs are stomachaches, heartburn, and nausea. NSAIDs may irritate the stomach lining. If the medicine upsets your stomach, you can try taking it with food. But if that doesn't help, talk with your doctor to make sure it's not a more serious problem, such as a stomach ulcer or bleeding in the stomach or intestines. Using NSAIDs may:  Lead to high blood pressure. Make symptoms of heart failure worse.   Raise the risk of heart attack, stroke, kidney damage, and skin reactions. Your risks are greater if you take NSAIDs at higher doses or for longer than the label says. People who are older than 72 or who have heart, stomach, or intestinal disease have a higher risk for problems. Further Imaging-we will obtain a MRI of the lumbar spine to evaluate severity of canal or foraminal stenosis. Follow up-after MRI to review test results and discuss further treatment options. Call or return to clinic if these symptoms worsen or fail to improve as anticipated. Alba Yoon PA-C   Senior Physician Assistant   [facility]                                         Disclaimer: This note was generated with use of a verbal recognition program (DRAGON) and an attempt was made to check for errors. It is possible that there are still dictated errors within this office note. If so, please bring any significant errors to my attention for an addendum. All efforts were made to ensure that this office note is accurate.

## 2022-09-08 ENCOUNTER — HOSPITAL ENCOUNTER (OUTPATIENT)
Dept: WOMENS IMAGING | Age: 52
Discharge: HOME | End: 2022-09-08
Payer: COMMERCIAL

## 2022-09-08 DIAGNOSIS — Z12.31 SCREENING MAMMOGRAM FOR BREAST CANCER: ICD-10-CM

## 2022-09-08 PROCEDURE — 77067 SCR MAMMO BI INCL CAD: CPT

## 2022-09-22 ENCOUNTER — TELEPHONE (OUTPATIENT)
Dept: ORTHOPEDIC SURGERY | Age: 52
End: 2022-09-22

## 2022-09-22 NOTE — TELEPHONE ENCOUNTER
.General Question     Subject: Patient call and she stated she cannot afford the price for that Mri she would like to know if there are other options she can do. She just need a call back regarding this matter. Please Advise.   Patient Claus Méndez  Contact Number: [phone]

## 2022-09-22 NOTE — TELEPHONE ENCOUNTER
LM for patient returning her call. Gwen Bentley suggests:    Continue with NSAIDS and therapy.     And/or    Get mri so we have further treatment options

## 2022-11-22 RX ORDER — AMLODIPINE AND VALSARTAN 5; 160 MG/1; MG/1
1 TABLET ORAL DAILY
Qty: 30 TABLET | Refills: 3 | Status: SHIPPED | OUTPATIENT
Start: 2022-11-22

## 2022-12-02 ENCOUNTER — OFFICE VISIT (OUTPATIENT)
Dept: FAMILY MEDICINE CLINIC | Age: 52
End: 2022-12-02
Payer: COMMERCIAL

## 2022-12-02 VITALS
WEIGHT: 275.8 LBS | SYSTOLIC BLOOD PRESSURE: 132 MMHG | OXYGEN SATURATION: 95 % | HEIGHT: 69 IN | BODY MASS INDEX: 40.85 KG/M2 | RESPIRATION RATE: 16 BRPM | HEART RATE: 97 BPM | DIASTOLIC BLOOD PRESSURE: 86 MMHG

## 2022-12-02 DIAGNOSIS — I10 ESSENTIAL HYPERTENSION: Primary | ICD-10-CM

## 2022-12-02 DIAGNOSIS — K76.0 NAFLD (NONALCOHOLIC FATTY LIVER DISEASE): ICD-10-CM

## 2022-12-02 DIAGNOSIS — R73.03 PREDIABETES: ICD-10-CM

## 2022-12-02 PROCEDURE — 99214 OFFICE O/P EST MOD 30 MIN: CPT | Performed by: FAMILY MEDICINE

## 2022-12-02 PROCEDURE — 3074F SYST BP LT 130 MM HG: CPT | Performed by: FAMILY MEDICINE

## 2022-12-02 PROCEDURE — 3078F DIAST BP <80 MM HG: CPT | Performed by: FAMILY MEDICINE

## 2022-12-02 RX ORDER — AMLODIPINE AND VALSARTAN 5; 160 MG/1; MG/1
1 TABLET ORAL DAILY
Qty: 90 TABLET | Refills: 1 | Status: SHIPPED | OUTPATIENT
Start: 2022-12-02

## 2022-12-02 RX ORDER — DULAGLUTIDE 0.75 MG/.5ML
0.75 INJECTION, SOLUTION SUBCUTANEOUS WEEKLY
Qty: 4 ADJUSTABLE DOSE PRE-FILLED PEN SYRINGE | Refills: 3 | Status: SHIPPED | OUTPATIENT
Start: 2022-12-02

## 2022-12-02 SDOH — ECONOMIC STABILITY: FOOD INSECURITY: WITHIN THE PAST 12 MONTHS, YOU WORRIED THAT YOUR FOOD WOULD RUN OUT BEFORE YOU GOT MONEY TO BUY MORE.: NEVER TRUE

## 2022-12-02 SDOH — ECONOMIC STABILITY: FOOD INSECURITY: WITHIN THE PAST 12 MONTHS, THE FOOD YOU BOUGHT JUST DIDN'T LAST AND YOU DIDN'T HAVE MONEY TO GET MORE.: NEVER TRUE

## 2022-12-02 ASSESSMENT — SOCIAL DETERMINANTS OF HEALTH (SDOH): HOW HARD IS IT FOR YOU TO PAY FOR THE VERY BASICS LIKE FOOD, HOUSING, MEDICAL CARE, AND HEATING?: NOT HARD AT ALL

## 2022-12-02 NOTE — PROGRESS NOTES
2022    Blood pressure 132/86, pulse 97, resp. rate 16, height 5' 8.5\" (1.74 m), weight 275 lb 12.8 oz (125.1 kg), SpO2 95 %, not currently breastfeeding. Berry Dimas (:  1970) is a 47 y.o. female, here for evaluation of the following medical concerns:    Chief Complaint   Patient presents with    Hypertension     6 month follow-up for hypertension. Here for routine follow up of HTN. No new concerns or problems. Weight up 10 lbs. BP meds: Exforge 5/160. No SE's. No hx of CAD, TIA, CVA or PVD. Last renal function test:   Lab Results   Component Value Date/Time     2022 08:08 AM    K 4.2 2022 08:08 AM    K 3.5 2021 02:50 PM    BUN 9 2022 08:08 AM    CREATININE 0.9 2022 08:08 AM     CrCl cannot be calculated (Patient's most recent lab result is older than the maximum 180 days allowed. ). Not on lipid lowering medicine. Last lipid test:  Lab Results   Component Value Date    CHOL 186 2022    TRIG 102 2022    HDL 49 2022    LDLCALC 117 (H) 2022     Lab Results   Component Value Date    ALT 20 2022    AST 20 2022     The 10-year ASCVD risk score (Danyelle ARMSTRONG, et al., 2019) is: 5.1%    Values used to calculate the score:      Age: 47 years      Sex: Female      Is Non- : Yes      Diabetic: No      Tobacco smoker: No      Systolic Blood Pressure: 974 mmHg      Is BP treated: Yes      HDL Cholesterol: 49 mg/dL      Total Cholesterol: 186 mg/dL     Hx NAFLD. With normal LFT's. Has hx prediabetes. Lab Results   Component Value Date/Time    LABA1C 6.0 2022 08:08 AM    LABA1C 5.8 2021 11:03 AM      Limited in physical activity by back pain, DDD, spinal stenosis. Used to go to gym, prior to [facility]. Patient Active Problem List   Diagnosis    Essential hypertension    H/O colonoscopy  (for IBS). benign polyps removed.     Sprain of anterior talofibular ligament of left ankle Menopausal symptoms    Class 2 severe obesity due to excess calories with serious comorbidity and body mass index (BMI) of 38.0 to 38.9 in adult [facility])    Irritable bowel syndrome with diarrhea    Idiopathic thrombocytopenic purpura (HCC) mild, sees Dr Jackie Lee    Plantar fasciitis, left    NAFLD (nonalcoholic fatty liver disease) sono 4/22    Prediabetes    DDD (degenerative disc disease), lumbar    Spinal stenosis of lumbar region with neurogenic claudication        Body mass index is 41.33 kg/m². Wt Readings from Last 3 Encounters:   12/02/22 275 lb 12.8 oz (125.1 kg)   09/06/22 264 lb (119.7 kg)   08/25/22 264 lb (119.7 kg)       BP Readings from Last 3 Encounters:   12/02/22 132/86   06/03/22 130/84   05/06/22 (!) 160/94       Allergies   Allergen Reactions    Oxycodone-Acetaminophen Itching       Prior to Visit Medications    Medication Sig Taking? Authorizing Provider   amLODIPine-valsartan (EXFORGE) 5-160 MG per tablet Take 1 tablet by mouth daily Yes Velma Richey MD   methylPREDNISolone (MEDROL, BRENDA,) 4 MG tablet Take by mouth. 6 po day one 5 po day 2 4 po day 3 3 po day 4 2 po day 5 1 po day 6. IRENE Khan   fexofenadine TY [facility] ALLERGY) 180 MG tablet Take 1 tablet by mouth daily  Velma Richey MD   fluticasone Tatiana Broach) 50 MCG/ACT nasal spray 2 sprays by Each Nostril route daily  Velma Richey MD        Social History     Tobacco Use    Smoking status: Never    Smokeless tobacco: Never   Vaping Use    Vaping Use: Never used   Substance Use Topics    Alcohol use: Yes     Alcohol/week: 0.0 standard drinks     Comment: socially    Drug use: No       Review of Systems No chest pains, dizziness, heart palpitations, dyspnea, lightheadedness, worsening edema. Physical Exam  Vitals and nursing note reviewed. Constitutional:       Appearance: Normal appearance. She is well-developed. She is obese. Neck:      Thyroid: No thyromegaly.    Cardiovascular:      Rate and Rhythm: Normal rate and regular rhythm. Pulses: Normal pulses. Heart sounds: Normal heart sounds. No murmur heard. Pulmonary:      Effort: Pulmonary effort is normal. No respiratory distress. Breath sounds: Normal breath sounds. No wheezing or rales. Musculoskeletal:         General: Normal range of motion. Cervical back: Normal range of motion. Skin:     General: Skin is warm and dry. Neurological:      General: No focal deficit present. Mental Status: She is alert and oriented to person, place, and time. Mental status is at baseline. Psychiatric:         Mood and Affect: Mood normal.         Behavior: Behavior normal.         Thought Content: Thought content normal.         Judgment: Judgment normal.        ASSESSMENT/PLAN:    1. Essential hypertension  - Blood pressure is at goal on current therapy; continue meds and monitoring. Regular physical activity and healthy diet (low sodium, multiple servings of produce daily) was recommended. Renal function to be assessed yearly. 2. NAFLD (nonalcoholic fatty liver disease) sono 4/22  - without LFT elevation. discussed relationship with insulin resistance and methods to reduce progression to PHAN/cirrhosis/liver failure, including weight reduction, reducing carbohydrates in the diet, regular exercise. discussed various ways to work on each of these goals. - discussed that GLP-1 agents have data showing reduction of insulin levels, resistance and delayed progression of NAFLD and prediabetes. After a discussion of risks/benefits and expected side effects, she wishes to try Trulicity 2.98AV weekly. If well tolerated, will want to push to highest tolerated dose for maximal effect on improving insulin sensitivity and reducing insulin resistance. 3. Prediabetes  As above; recheck A1c at next visit. Return in about 6 months (around 6/2/2023) for follow up HTN, fasting labs.     An  Sykioignature was used to [employer] this note.     --Sumanth Becerra MD on 12/2/2022 at 3:57 PM

## 2022-12-06 ENCOUNTER — TELEPHONE (OUTPATIENT)
Dept: FAMILY MEDICINE CLINIC | Age: 52
End: 2022-12-06

## 2022-12-06 ENCOUNTER — OFFICE VISIT (OUTPATIENT)
Dept: FAMILY MEDICINE CLINIC | Age: 52
End: 2022-12-06
Payer: COMMERCIAL

## 2022-12-06 VITALS
OXYGEN SATURATION: 97 % | WEIGHT: 275 LBS | SYSTOLIC BLOOD PRESSURE: 120 MMHG | DIASTOLIC BLOOD PRESSURE: 84 MMHG | BODY MASS INDEX: 40.73 KG/M2 | HEIGHT: 69 IN | HEART RATE: 110 BPM | TEMPERATURE: 102.3 F

## 2022-12-06 DIAGNOSIS — J10.1 INFLUENZA A: ICD-10-CM

## 2022-12-06 DIAGNOSIS — R68.89 FLU-LIKE SYMPTOMS: Primary | ICD-10-CM

## 2022-12-06 LAB
INFLUENZA A ANTIBODY: ABNORMAL
INFLUENZA B ANTIBODY: ABNORMAL

## 2022-12-06 PROCEDURE — 3078F DIAST BP <80 MM HG: CPT | Performed by: FAMILY MEDICINE

## 2022-12-06 PROCEDURE — 3074F SYST BP LT 130 MM HG: CPT | Performed by: FAMILY MEDICINE

## 2022-12-06 PROCEDURE — 99213 OFFICE O/P EST LOW 20 MIN: CPT | Performed by: FAMILY MEDICINE

## 2022-12-06 PROCEDURE — 87804 INFLUENZA ASSAY W/OPTIC: CPT | Performed by: FAMILY MEDICINE

## 2022-12-06 RX ORDER — OSELTAMIVIR PHOSPHATE 75 MG/1
75 CAPSULE ORAL 2 TIMES DAILY
Qty: 10 CAPSULE | Refills: 0 | Status: SHIPPED | OUTPATIENT
Start: 2022-12-06 | End: 2022-12-11

## 2022-12-06 NOTE — TELEPHONE ENCOUNTER
Called pt and lvm  Will need appt  She could have strep, flu, covid. .  These all have different treatments so can't prescribe a medication wo confirming what she has. She can take home covid test and let us know if it's positive. Strep and flu can be done in office  Thank you!

## 2022-12-06 NOTE — LETTER
[address] [facility] [address] [facility]  Phone: [phone]  Fax: [phone]    Anat Chavez MD        December 6, 2022     Patient: Gracia Encinas   YOB: 1970   Date of Visit: 12/6/2022       To Whom It May Concern: It is my medical opinion that Brissa Phoenix has influenza A and may not return to work until 12/12/22. If you have any questions or concerns, please don't hesitate to call.     Sincerely,        Anat Chavez MD

## 2022-12-06 NOTE — TELEPHONE ENCOUNTER
Pt calling in. She saw Dr. Aamir Helm on Friday 12. 2.2022 then started feeling bad Sunday. She started Sunday with a cough, sore throat, body aches, and losing her voice The over the counter medication she took was ibuprofen that helped with her throat. She is having pain in her throat when she coughs. Stated that since she was seen on Friday she woud like to have medication sent in for her.      Pharm Confirmed- [facility] on [address]    Please Advise  Pt can be reached at [phone]

## 2022-12-06 NOTE — PROGRESS NOTES
2022    Blood pressure 120/84, pulse (!) 110, temperature (!) 102.3 °F (39.1 °C), temperature source Oral, height 5' 8.5\" (1.74 m), weight 275 lb (124.7 kg), SpO2 97 %, not currently breastfeeding. Gonzalez Driver (:  1970) is a 47 y.o. female, here for evaluation of the following medical concerns:    Chief Complaint   Patient presents with    Cough     Cough started monday, sore throat started , body aches started monday     Here for illness. Onset yesterday with ST, aches, cough, moderate fatigue. No swollen neck glands. But neck is achy  No ill contacts  Works in [employer], but remains in her office. No n/v/d  No CP or SOB. Home COVID test neg yesterday. Rx used: ibuprofen. Patient Active Problem List   Diagnosis    Essential hypertension    H/O colonoscopy  (for IBS). benign polyps removed. Sprain of anterior talofibular ligament of left ankle    Menopausal symptoms    Class 2 severe obesity due to excess calories with serious comorbidity and body mass index (BMI) of 38.0 to 38.9 in adult [facility])    Irritable bowel syndrome with diarrhea    Idiopathic thrombocytopenic purpura (HCC) mild, sees Dr Lala Singer    Plantar fasciitis, left    NAFLD (nonalcoholic fatty liver disease) sono     Prediabetes    DDD (degenerative disc disease), lumbar    Spinal stenosis of lumbar region with neurogenic claudication        Body mass index is 41.21 kg/m². Wt Readings from Last 3 Encounters:   22 275 lb (124.7 kg)   22 275 lb 12.8 oz (125.1 kg)   22 264 lb (119.7 kg)       BP Readings from Last 3 Encounters:   22 120/84   22 132/86   22 130/84       Allergies   Allergen Reactions    Oxycodone-Acetaminophen Itching       Prior to Visit Medications    Medication Sig Taking?  Authorizing Provider   amLODIPine-valsartan (EXFORGE) 5-160 MG per tablet Take 1 tablet by mouth daily Yes Nancy Wilcox MD   Dulaglutide (TRULICITY) 1.22 FE/7.1PF [facility] Inject 0.75 mg into the skin once a week  Patient not taking: Reported on 12/6/2022  Ranjana Brand MD        Social History     Tobacco Use    Smoking status: Never    Smokeless tobacco: Never   Vaping Use    Vaping Use: Never used   Substance Use Topics    Alcohol use: Yes     Alcohol/week: 0.0 standard drinks     Comment: socially    Drug use: No       Review of Systems As above     Physical Exam  Vitals and nursing note reviewed. Constitutional:       General: She is not in acute distress. Appearance: Normal appearance. She is well-developed. She is not toxic-appearing or diaphoretic. HENT:      Head: Normocephalic and atraumatic. Right Ear: Tympanic membrane, ear canal and external ear normal. There is no impacted cerumen. Left Ear: Tympanic membrane, ear canal and external ear normal. There is no impacted cerumen. Nose: Nose normal. No congestion or rhinorrhea. Mouth/Throat:      Mouth: Mucous membranes are moist.      Pharynx: Oropharynx is clear. No oropharyngeal exudate or posterior oropharyngeal erythema. Eyes:      General:         Right eye: No discharge. Left eye: No discharge. Conjunctiva/sclera: Conjunctivae normal.      Pupils: Pupils are equal, round, and reactive to light. Neck:      Thyroid: No thyromegaly. Trachea: No tracheal deviation. Cardiovascular:      Rate and Rhythm: Normal rate and regular rhythm. Heart sounds: Normal heart sounds. No murmur heard. No friction rub. No gallop. Pulmonary:      Effort: Pulmonary effort is normal. No respiratory distress. Breath sounds: Normal breath sounds. No wheezing or rales. Chest:      Chest wall: No tenderness. Musculoskeletal:         General: Normal range of motion. Cervical back: Normal range of motion and neck supple. Lymphadenopathy:      Cervical: No cervical adenopathy. Upper Body:      Right upper body: No supraclavicular adenopathy.       Left upper body: No supraclavicular adenopathy. Skin:     General: Skin is warm and dry. Findings: No rash. Neurological:      General: No focal deficit present. Mental Status: She is alert and oriented to person, place, and time. Mental status is at baseline. Psychiatric:         Mood and Affect: Mood normal.         Behavior: Behavior normal.         Thought Content: Thought content normal.         Judgment: Judgment normal.       ASSESSMENT/PLAN:    1. Flu-like symptoms  - POCT Influenza A/B; POS for flu A    2. Influenza A  - rx tamiflu 75 bid x 5 days  - rest/fluids/tylenol/cough rx, etc  - discussed natural history of an influenza infection. - f/u if new or worsening symptoms after a week   - call or go to ER if short of breath      An  onkeaignature was used to authenticate this note.     --Benjamín Colunga MD on 12/6/2022 at 4:36 PM

## 2023-03-09 ENCOUNTER — APPOINTMENT (OUTPATIENT)
Dept: GENERAL RADIOLOGY | Age: 53
End: 2023-03-09
Payer: COMMERCIAL

## 2023-03-09 ENCOUNTER — HOSPITAL ENCOUNTER (EMERGENCY)
Age: 53
Discharge: HOME | End: 2023-03-09
Attending: EMERGENCY MEDICINE
Payer: COMMERCIAL

## 2023-03-09 VITALS
RESPIRATION RATE: 16 BRPM | HEART RATE: 74 BPM | SYSTOLIC BLOOD PRESSURE: 137 MMHG | DIASTOLIC BLOOD PRESSURE: 88 MMHG | TEMPERATURE: 98.4 F | WEIGHT: 274.4 LBS | BODY MASS INDEX: 40.64 KG/M2 | HEIGHT: 69 IN | OXYGEN SATURATION: 100 %

## 2023-03-09 DIAGNOSIS — R07.9 CHEST PAIN, UNSPECIFIED TYPE: Primary | ICD-10-CM

## 2023-03-09 LAB
ANION GAP SERPL CALCULATED.3IONS-SCNC: 9 MMOL/L (ref 3–16)
BASOPHILS ABSOLUTE: 0 K/UL (ref 0–0.2)
BASOPHILS RELATIVE PERCENT: 0.5 %
BUN BLDV-MCNC: 10 MG/DL (ref 7–20)
CALCIUM SERPL-MCNC: 9.9 MG/DL (ref 8.3–10.6)
CHLORIDE BLD-SCNC: 106 MMOL/L (ref 99–110)
CO2: 27 MMOL/L (ref 21–32)
CREAT SERPL-MCNC: 0.9 MG/DL (ref 0.6–1.1)
EKG ATRIAL RATE: 74 BPM
EKG DIAGNOSIS: NORMAL
EKG P AXIS: 49 DEGREES
EKG P-R INTERVAL: 156 MS
EKG Q-T INTERVAL: 370 MS
EKG QRS DURATION: 86 MS
EKG QTC CALCULATION (BAZETT): 410 MS
EKG R AXIS: -22 DEGREES
EKG T AXIS: -1 DEGREES
EKG VENTRICULAR RATE: 74 BPM
EOSINOPHILS ABSOLUTE: 0 K/UL (ref 0–0.6)
EOSINOPHILS RELATIVE PERCENT: 0.5 %
GFR SERPL CREATININE-BSD FRML MDRD: >60 ML/MIN/{1.73_M2}
GLUCOSE BLD-MCNC: 91 MG/DL (ref 70–99)
HCG QUALITATIVE: NEGATIVE
HCT VFR BLD CALC: 45.2 % (ref 36–48)
HEMOGLOBIN: 14.7 G/DL (ref 12–16)
LV EF: 74 %
LVEF MODALITY: NORMAL
LYMPHOCYTES ABSOLUTE: 1.6 K/UL (ref 1–5.1)
LYMPHOCYTES RELATIVE PERCENT: 24.2 %
MAGNESIUM: 1.9 MG/DL (ref 1.8–2.4)
MCH RBC QN AUTO: 28.1 PG (ref 26–34)
MCHC RBC AUTO-ENTMCNC: 32.6 G/DL (ref 31–36)
MCV RBC AUTO: 86.1 FL (ref 80–100)
MONOCYTES ABSOLUTE: 0.6 K/UL (ref 0–1.3)
MONOCYTES RELATIVE PERCENT: 8.2 %
NEUTROPHILS ABSOLUTE: 4.5 K/UL (ref 1.7–7.7)
NEUTROPHILS RELATIVE PERCENT: 66.6 %
PDW BLD-RTO: 14.5 % (ref 12.4–15.4)
PLATELET # BLD: 157 K/UL (ref 135–450)
PLATELET SLIDE REVIEW: ADEQUATE
PMV BLD AUTO: 11.6 FL (ref 5–10.5)
POTASSIUM REFLEX MAGNESIUM: 3.5 MMOL/L (ref 3.5–5.1)
PRO-BNP: 96 PG/ML (ref 0–124)
RBC # BLD: 5.25 M/UL (ref 4–5.2)
RBC # BLD: NORMAL 10*6/UL
SODIUM BLD-SCNC: 142 MMOL/L (ref 136–145)
TROPONIN: <0.01 NG/ML
TROPONIN: <0.01 NG/ML
WBC # BLD: 6.7 K/UL (ref 4–11)

## 2023-03-09 PROCEDURE — 93005 ELECTROCARDIOGRAM TRACING: CPT | Performed by: EMERGENCY MEDICINE

## 2023-03-09 PROCEDURE — 96374 THER/PROPH/DIAG INJ IV PUSH: CPT

## 2023-03-09 PROCEDURE — 78452 HT MUSCLE IMAGE SPECT MULT: CPT

## 2023-03-09 PROCEDURE — 85025 COMPLETE CBC W/AUTO DIFF WBC: CPT

## 2023-03-09 PROCEDURE — 83735 ASSAY OF MAGNESIUM: CPT

## 2023-03-09 PROCEDURE — 71046 X-RAY EXAM CHEST 2 VIEWS: CPT

## 2023-03-09 PROCEDURE — 93017 CV STRESS TEST TRACING ONLY: CPT

## 2023-03-09 PROCEDURE — 36415 COLL VENOUS BLD VENIPUNCTURE: CPT

## 2023-03-09 PROCEDURE — 83880 ASSAY OF NATRIURETIC PEPTIDE: CPT

## 2023-03-09 PROCEDURE — 99285 EMERGENCY DEPT VISIT HI MDM: CPT

## 2023-03-09 PROCEDURE — A9502 TC99M TETROFOSMIN: HCPCS | Performed by: EMERGENCY MEDICINE

## 2023-03-09 PROCEDURE — 80048 BASIC METABOLIC PNL TOTAL CA: CPT

## 2023-03-09 PROCEDURE — 3430000000 HC RX DIAGNOSTIC RADIOPHARMACEUTICAL: Performed by: EMERGENCY MEDICINE

## 2023-03-09 PROCEDURE — 84703 CHORIONIC GONADOTROPIN ASSAY: CPT

## 2023-03-09 PROCEDURE — 84484 ASSAY OF TROPONIN QUANT: CPT

## 2023-03-09 PROCEDURE — 6370000000 HC RX 637 (ALT 250 FOR IP): Performed by: EMERGENCY MEDICINE

## 2023-03-09 RX ORDER — ASPIRIN 81 MG/1
324 TABLET, CHEWABLE ORAL ONCE
Status: COMPLETED | OUTPATIENT
Start: 2023-03-09 | End: 2023-03-09

## 2023-03-09 RX ADMIN — TETROFOSMIN 10 MILLICURIE: 1.38 INJECTION, POWDER, LYOPHILIZED, FOR SOLUTION INTRAVENOUS at 14:28

## 2023-03-09 RX ADMIN — ASPIRIN 81 MG 324 MG: 81 TABLET ORAL at 12:03

## 2023-03-09 ASSESSMENT — PAIN DESCRIPTION - FREQUENCY: FREQUENCY: CONTINUOUS

## 2023-03-09 ASSESSMENT — PAIN DESCRIPTION - PAIN TYPE: TYPE: ACUTE PAIN

## 2023-03-09 ASSESSMENT — PAIN DESCRIPTION - ORIENTATION: ORIENTATION: LEFT

## 2023-03-09 ASSESSMENT — PAIN DESCRIPTION - DESCRIPTORS: DESCRIPTORS: ACHING

## 2023-03-09 ASSESSMENT — PAIN - FUNCTIONAL ASSESSMENT: PAIN_FUNCTIONAL_ASSESSMENT: 0-10

## 2023-03-09 ASSESSMENT — PAIN DESCRIPTION - LOCATION: LOCATION: CHEST

## 2023-03-09 ASSESSMENT — PAIN SCALES - GENERAL: PAINLEVEL_OUTOF10: 3

## 2023-03-09 NOTE — DISCHARGE INSTRUCTIONS
You were seen in the Emergency Department today for chest pain. You should have a discussion with your doctor about starting taking 81 mg of aspirin on a daily basis. Return to the Emergency Department if:    you have new, worse or continued chest pain    you experience shortness of breath, nausea or vomiting, chest pain with exertion, or sweating with chest pain    or if you have any other concerns   Please follow up with your physician or clinic within the next 2-3 days.

## 2023-03-09 NOTE — ED PROVIDER NOTES
[address] [facility] [address] ENCOUNTER          ATTENDING PHYSICIAN NOTE       Date of evaluation: 3/9/2023    Chief Complaint     Chest Pain (Started this morning at work, states it started in left shoulder blade, radiated across back and into right arm. Then pain intermittently in chest since. )      History of Present Illness     Chloe Turk is a 47 y.o. female who presents the emergency department with complaints of chest pain. Patient states that she felt generally well when she awoke this morning. She went to work. At around 9 AM she went to the daily morning meeting, and as she left her office she noticed a pain that she states began in her left shoulder, traveled across the chest and into the right shoulder and into the right shoulder blade, with some pain radiating down her right arm. She describes the pain as a heavy, breathless, pressure-like sensation. Over the next hour, she noticed that it was worse whenever she would stand up and walk, and would improve whenever she sat down to rest.  She has never felt pain like this before. The patient states that she has been evaluated in the emergency department for chest pain in the past, but that was a sharp, stabbing painful pain, rather than a breathless, winded, heavy sensation. Patient states that she is never seen a cardiologist, does not have any heart problems that she is aware of, and has never had a stress test.  She does have a history of hypertension, prediabetes, nonalcoholic fatty liver disease, and obesity. She has no history of smoking. She does have a family history of heart disease, with her father having had significant coronary artery disease, per her report. She denies any decreased exercise tolerance, dyspnea or chest discomfort on exertion, orthopnea, PND in the weeks leading up to today. She denies any lower extremity pain or edema.     Review of Systems     Review of Systems   All other systems reviewed and are negative. Past Medical, Surgical, Family, and Social History     She has a past medical history of DDD (degenerative disc disease), lumbar, HTN (hypertension), IBS (irritable bowel syndrome), Lactose intolerance, and NAFLD (nonalcoholic fatty liver disease) sono 4/22. She has a past surgical history that includes cyst removal; Total abdominal hysterectomy w/ bilateral salpingoophorectomy (10/27/2016); Colonoscopy; and Colonoscopy (N/A, 1/12/2022). Her family history includes Dementia in her mother; Diabetes in her sister; Heart Disease in her father; High Blood Pressure in her mother and sister. She reports that she has never smoked. She has never used smokeless tobacco. She reports current alcohol use. She reports that she does not use drugs. Medications     Discharge Medication List as of 3/9/2023  5:39 PM        CONTINUE these medications which have NOT CHANGED    Details   Dulaglutide (TRULICITY) 5.34 NT/8.3FC SOPN Inject 0.75 mg into the skin once a week, Disp-4 Adjustable Dose Pre-filled Pen Syringe, R-3Normal      amLODIPine-valsartan (EXFORGE) 5-160 MG per tablet Take 1 tablet by mouth daily, Disp-90 tablet, R-1Normal             Allergies     She is allergic to oxycodone. Physical Exam     INITIAL VITALS: BP: (!) 159/102, Temp: 98.4 °F (36.9 °C), Heart Rate: 89, Resp: 18, SpO2: 100 %     General: Generally well-appearing, pleasantly conversational patient. BMI 41. No acute respiratory distress. HEENT: Pupils are equal, round, and reactive to light. Extraocular muscles are intact. Conjunctivae are clear and moist. No redness or drainage from the eyes. No drainage from the nose. The oropharynx appeared to be normal.    Neck: Supple, with full range of motion. Chest: Minimal generalized tenderness to palpation. No crepitus or step-offs. Cardiovascular: Normal S1-S2 without murmur rub or gallop. 2+ radial pulses bilaterally.      Respiratory: Unlabored breathing with equal chest rise and fall. Lungs are clear to auscultation bilaterally. No adventitious lung sounds heard. Abdomen: Soft and nontender, without guarding or rebound tenderness. No masses or hepatosplenomegaly. Skin: Warm and dry, without rashes or ecchymoses, lacerations or abrasions. Neuro: Alert and oriented x3. No focal neurologic deficits are noted. Extremities: Warm and well-perfused, without clubbing, cyanosis, or edema. The patient moves all extremities equally. Psych: The patient's mood and affect are generally within normal limits for their presentation. Diagnostic Results     EKG   Normal sinus rhythm with a ventricular rate of 74 bpm.  Normal axis. Normal intervals, with CT interval 156, QRS duration 86 ms, QTc 410 ms. No ST segment or T wave abnormalities are noted. RADIOLOGY:  NM Cardiac Stress Test Nuclear Imaging   Final Result      XR CHEST (2 VW)   Final Result   Impression: No acute abnormality or significant interval change.           LABS:   Results for orders placed or performed during the hospital encounter of 03/09/23   NM Cardiac Stress Test Nuclear Imaging   Result Value Ref Range    Left Ventricular Ejection Fraction 74     LVEF MODALITY Nuclear    CBC with Auto Differential   Result Value Ref Range    WBC 6.7 4.0 - 11.0 K/uL    RBC 5.25 (H) 4.00 - 5.20 M/uL    Hemoglobin 14.7 12.0 - 16.0 g/dL    Hematocrit 45.2 36.0 - 48.0 %    MCV 86.1 80.0 - 100.0 fL    MCH 28.1 26.0 - 34.0 pg    MCHC 32.6 31.0 - 36.0 g/dL    RDW 14.5 12.4 - 15.4 %    Platelets 164 660 - 527 K/uL    MPV 11.6 (H) 5.0 - 10.5 fL    PLATELET SLIDE REVIEW Adequate     Neutrophils % 66.6 %    Lymphocytes % 24.2 %    Monocytes % 8.2 %    Eosinophils % 0.5 %    Basophils % 0.5 %    Neutrophils Absolute 4.5 1.7 - 7.7 K/uL    Lymphocytes Absolute 1.6 1.0 - 5.1 K/uL    Monocytes Absolute 0.6 0.0 - 1.3 K/uL    Eosinophils Absolute 0.0 0.0 - 0.6 K/uL    Basophils Absolute 0.0 0.0 - 0.2 K/uL    RBC Morphology Normal Basic Metabolic Panel w/ Reflex to MG   Result Value Ref Range    Sodium 142 136 - 145 mmol/L    Potassium reflex Magnesium 3.5 3.5 - 5.1 mmol/L    Chloride 106 99 - 110 mmol/L    CO2 27 21 - 32 mmol/L    Anion Gap 9 3 - 16    Glucose 91 70 - 99 mg/dL    BUN 10 7 - 20 mg/dL    Creatinine 0.9 0.6 - 1.1 mg/dL    Est, Glom Filt Rate >60 >60    Calcium 9.9 8.3 - 10.6 mg/dL   Brain Natriuretic Peptide   Result Value Ref Range    Pro-BNP 96 0 - 124 pg/mL   Troponin   Result Value Ref Range    Troponin <0.01 <0.01 ng/mL   Magnesium   Result Value Ref Range    Magnesium 1.90 1.80 - 2.40 mg/dL   Troponin   Result Value Ref Range    Troponin <0.01 <0.01 ng/mL   HCG Qualitative, Serum   Result Value Ref Range    hCG Qual Negative Detects HCG level >10 MIU/mL   EKG 12 Lead   Result Value Ref Range    Ventricular Rate 74 BPM    Atrial Rate 74 BPM    P-R Interval 156 ms    QRS Duration 86 ms    Q-T Interval 370 ms    QTc Calculation (Bazett) 410 ms    P Axis 49 degrees    R Axis -22 degrees    T Axis -1 degrees    Diagnosis       EKG performed in ER and to be interpreted by ER physician. Confirmed by MD, ER ([id number]),  Kris Panda ([id number]) on 3/9/2023 2:49:47 PM       RECENT VITALS:  BP: 137/88, Temp: 98.4 °F (36.9 °C), Heart Rate: 74, Resp: 16, SpO2: 100 %     Procedures         ED Course     Nursing Notes, Past Medical Hx, Past Surgical Hx, Social Hx, Allergies, and Family Hx were reviewed.     The patient was given the following medications:  Orders Placed This Encounter   Medications    aspirin chewable tablet 324 mg    DISCONTD: regadenoson (LEXISCAN) injection 0.4 mg    technetium tetrofosmin (Tc-MYOVIEW) injection 10 millicurie    DISCONTD: technetium tetrofosmin (Tc-MYOVIEW) injection 30 millicurie       CONSULTS:  None    MEDICAL DECISION MAKING / ASSESSMENT / Loc Caballero is a 47 y.o. female with no prior cardiac history but with a history of hypertension, obesity, and a family history of coronary artery disease, who presents to the emergency department with a concerning episode of chest pain that occurred this morning, which was a heavy, pressure like \"winded\" sensation across her chest, which was worse when she was up and moving, and improved when she sat down to rest.  Her EKG shows no acute ischemic changes. She was given 324 mg of chewable aspirin, and cardiac work-up was further initiated. Troponin is undetectable x2. The patient does have some cardiovascular risk factors, and a particularly concerning history, and has not had provocative testing. Fortunately, she is not on beta-blockers, and has not had any caffeine today, and so is a candidate for a nuclear medicine stress test today. We have contacted the nuclear medicine department, and they are able to perform the stress test today. The results of that study are pending. The patient's case was given over to the oncoming physician, who will follow up on the results of the stress test, and determine appropriate disposition accordingly. (Please note that portions of this note were completed with voice recognition software.   Efforts were made to edit the dictations but occasionally words are mis-transcribed.)     Dayton Zarate MD  03/10/23 [id number]

## 2023-03-09 NOTE — ED PROVIDER NOTES
[address] ENCOUNTER          ATTENDING PHYSICIAN NOTE       Date of evaluation: 3/9/2023    ADDENDUM:      Care of this patient was assumed from Dr. Andreas Francis. The patient was seen for Chest Pain (Started this morning at work, states it started in left shoulder blade, radiated across back and into right arm. Then pain intermittently in chest since. )  . The patient's initial evaluation and plan have been discussed with the prior provider who initially evaluated the patient. Nursing Notes, Past Medical Hx, Past Surgical Hx, Social Hx, Allergies, and Family Hx were all reviewed. ASSESSMENT / PLAN  (Aleyda Lucero)     Santos Huff is a 47 y.o. female presenting with an episode of chest pressure that she states that happened while she was at work. She had noticed some symptoms worse with exertion as well. Patient with occasional feelings of the wound taken out of her nares well which was concerning for her. Patient's work-up at this time had included negative cardiac enzymes and she had a stress test ordered which she had done here in the department today. At this time I have been asked to follow-up on the patient's stress test and disposition accordingly. Medical Decision Making  The patient was noted to have chest pain and had a cardiac work-up done here in the department started by my colleague prior to my arrival.  She was pending a stress test and her stress test at this time is otherwise negative. The rest of her work-up is overall unremarkable. Patient with low risk factors for pulmonary embolism. She is pain-free at this time. Problems Addressed:  Chest pain, unspecified type: acute illness or injury    Amount and/or Complexity of Data Reviewed  External Data Reviewed: labs and notes. Labs: ordered. Radiology: ordered. ECG/medicine tests: ordered. Risk  OTC drugs. Prescription drug management. Clinical Impression     1.  Chest pain, unspecified type        Disposition     PATIENT REFERRED TO:  Nelda Bower, [address]  [address] [place] [address]  [phone]    Schedule an appointment as soon as possible for a visit in 1 week  For follow-up    DISCHARGE MEDICATIONS:  New Prescriptions    No medications on file       DISPOSITION Decision To Discharge 03/09/2023 05:22:39 PM        Diagnostic Results and Other Data                                   RADIOLOGY:  NM Cardiac Stress Test Nuclear Imaging   Final Result      XR CHEST (2 VW)   Final Result   Impression: No acute abnormality or significant interval change.           LABS:   Results for orders placed or performed during the hospital encounter of 03/09/23   CBC with Auto Differential   Result Value Ref Range    WBC 6.7 4.0 - 11.0 K/uL    RBC 5.25 (H) 4.00 - 5.20 M/uL    Hemoglobin 14.7 12.0 - 16.0 g/dL    Hematocrit 45.2 36.0 - 48.0 %    MCV 86.1 80.0 - 100.0 fL    MCH 28.1 26.0 - 34.0 pg    MCHC 32.6 31.0 - 36.0 g/dL    RDW 14.5 12.4 - 15.4 %    Platelets 913 660 - 472 K/uL    MPV 11.6 (H) 5.0 - 10.5 fL    PLATELET SLIDE REVIEW Adequate     Neutrophils % 66.6 %    Lymphocytes % 24.2 %    Monocytes % 8.2 %    Eosinophils % 0.5 %    Basophils % 0.5 %    Neutrophils Absolute 4.5 1.7 - 7.7 K/uL    Lymphocytes Absolute 1.6 1.0 - 5.1 K/uL    Monocytes Absolute 0.6 0.0 - 1.3 K/uL    Eosinophils Absolute 0.0 0.0 - 0.6 K/uL    Basophils Absolute 0.0 0.0 - 0.2 K/uL    RBC Morphology Normal    Basic Metabolic Panel w/ Reflex to MG   Result Value Ref Range    Sodium 142 136 - 145 mmol/L    Potassium reflex Magnesium 3.5 3.5 - 5.1 mmol/L    Chloride 106 99 - 110 mmol/L    CO2 27 21 - 32 mmol/L    Anion Gap 9 3 - 16    Glucose 91 70 - 99 mg/dL    BUN 10 7 - 20 mg/dL    Creatinine 0.9 0.6 - 1.1 mg/dL    Est, Glom Filt Rate >60 >60    Calcium 9.9 8.3 - 10.6 mg/dL   Brain Natriuretic Peptide   Result Value Ref Range    Pro-BNP 96 0 - 124 pg/mL   Troponin   Result Value Ref Range Troponin <0.01 <0.01 ng/mL   Magnesium   Result Value Ref Range    Magnesium 1.90 1.80 - 2.40 mg/dL   Troponin   Result Value Ref Range    Troponin <0.01 <0.01 ng/mL   HCG Qualitative, Serum   Result Value Ref Range    hCG Qual Negative Detects HCG level >10 MIU/mL   EKG 12 Lead   Result Value Ref Range    Ventricular Rate 74 BPM    Atrial Rate 74 BPM    P-R Interval 156 ms    QRS Duration 86 ms    Q-T Interval 370 ms    QTc Calculation (Bazett) 410 ms    P Axis 49 degrees    R Axis -22 degrees    T Axis -1 degrees    Diagnosis       EKG performed in ER and to be interpreted by ER physician. Confirmed by MD, ER ([id number]),  Marlene Huber ([id number]) on 3/9/2023 2:49:47 PM     EKG   Indication chest pain. Heart rate 74. Normal sinus rhythm. No ST segment elevation or depression. No QTc prolongation. MOST RECENT VITALS:  BP: 137/88, Temp: 98.4 °F (36.9 °C), Heart Rate: 74, Resp: 16, SpO2: 100 %     Procedures         ED Course     ED Course as of 03/09/23 1725   Thu Mar 09, 2023   1722 The patient stress test at this time came back negative for any acute infarction or scarring. Was a low risk study. Patient will be given referral to her PCP at this time for regular outpatient follow-up.  [EI]      ED Course User Index  [EI] Charlotte Arredondo MD       The patient was given the following medications:  Orders Placed This Encounter   Medications    aspirin chewable tablet 324 mg    regadenoson (LEXISCAN) injection 0.4 mg    technetium tetrofosmin (Tc-MYOVIEW) injection 10 millicurie    technetium tetrofosmin (Tc-MYOVIEW) injection 30 millicurie       CONSULTS:  None       Charlotte Arredondo MD  03/09/23 [id number]

## 2023-05-11 ENCOUNTER — OFFICE VISIT (OUTPATIENT)
Dept: ORTHOPEDIC SURGERY | Age: 53
End: 2023-05-11
Payer: COMMERCIAL

## 2023-05-11 VITALS — HEIGHT: 69 IN | BODY MASS INDEX: 39.84 KG/M2 | WEIGHT: 269 LBS

## 2023-05-11 DIAGNOSIS — M48.061 LUMBAR FORAMINAL STENOSIS: Primary | ICD-10-CM

## 2023-05-11 PROCEDURE — 99213 OFFICE O/P EST LOW 20 MIN: CPT | Performed by: PHYSICIAN ASSISTANT

## 2023-05-11 NOTE — PROGRESS NOTES
status: Never    Smokeless tobacco: Never   Vaping Use    Vaping Use: Never used   Substance and Sexual Activity    Alcohol use: Yes     Alcohol/week: 0.0 standard drinks     Comment: socially    Drug use: No    Sexual activity: Yes     Partners: Male       Current Outpatient Medications   Medication Sig Dispense Refill    Dulaglutide (TRULICITY) 0.18 DU/8.5GQ SOPN Inject 0.75 mg into the skin once a week 4 Adjustable Dose Pre-filled Pen Syringe 3    amLODIPine-valsartan (EXFORGE) 5-160 MG per tablet Take 1 tablet by mouth daily 90 tablet 1     No current facility-administered medications for this visit. Objective:     Ht 5' 8.5\" (1.74 m)   Wt 269 lb (122 kg)   BMI 40.31 kg/m²      General exam:  She is well-developed and well-nourished, is in obvious discomfort and alert and oriented to person, place, and time. She demonstrates appropriate mood and affect. Her skin is warm and dry. Her gait is normal and she walks heel to toe without significant limp or instability. Back:  She stands with slight lumbar flexion. Her lumbar flexion, extension and lateral bending are mildly reduced with pain. She has mild tenderness over her lumbar spine without obvious muscle spasm. The skin over her lumbar spine is normal without a surgical scar    MRI of the Lumbar Spine dated 5/3/23 reviewed and discussed today. CONCLUSION:   1. Spondylotic changes of the lumbar spine, most prominent at L4-L5. 2. At L4-L5, posterior disc bulge with facet arthropathy result in moderate to severe right and    moderate left neural foraminal narrowing, contacting the bilateral exiting L4 nerve roots. 3. At L5-S1, diffuse disc bulge and facet arthropathy result in moderate bilateral neural   foraminal narrowing. 4. No significant spinal canal stenosis in the lumbar spine.      Diagnosis:       ICD-10-CM    1. Lumbar foraminal stenosis  M48.061 FER - Cristal Hancock MD, Pain Management, [facility] \[facility] [place] [facility]\"" - Critical access hospital

## 2023-08-15 ENCOUNTER — APPOINTMENT (OUTPATIENT)
Dept: CT IMAGING | Age: 53
End: 2023-08-15

## 2023-08-15 ENCOUNTER — HOSPITAL ENCOUNTER (EMERGENCY)
Age: 53
Discharge: HOME | End: 2023-08-15
Attending: EMERGENCY MEDICINE

## 2023-08-15 ENCOUNTER — APPOINTMENT (OUTPATIENT)
Dept: GENERAL RADIOLOGY | Age: 53
End: 2023-08-15

## 2023-08-15 VITALS
WEIGHT: 268.3 LBS | SYSTOLIC BLOOD PRESSURE: 129 MMHG | DIASTOLIC BLOOD PRESSURE: 88 MMHG | RESPIRATION RATE: 14 BRPM | TEMPERATURE: 98.5 F | BODY MASS INDEX: 40.2 KG/M2 | HEART RATE: 77 BPM | OXYGEN SATURATION: 100 %

## 2023-08-15 DIAGNOSIS — R07.89 CHEST WALL PAIN: Primary | ICD-10-CM

## 2023-08-15 LAB
ALBUMIN SERPL-MCNC: 3.7 G/DL (ref 3.4–5)
ALBUMIN/GLOB SERPL: 1 {RATIO} (ref 1.1–2.2)
ALP SERPL-CCNC: 143 U/L (ref 40–129)
ALT SERPL-CCNC: 21 U/L (ref 10–40)
ANION GAP SERPL CALCULATED.3IONS-SCNC: 8 MMOL/L (ref 3–16)
AST SERPL-CCNC: 16 U/L (ref 15–37)
BASOPHILS # BLD: 0 K/UL (ref 0–0.2)
BASOPHILS NFR BLD: 0.2 %
BILIRUB SERPL-MCNC: 0.3 MG/DL (ref 0–1)
BUN SERPL-MCNC: 15 MG/DL (ref 7–20)
CALCIUM SERPL-MCNC: 9.8 MG/DL (ref 8.3–10.6)
CHLORIDE SERPL-SCNC: 108 MMOL/L (ref 99–110)
CO2 SERPL-SCNC: 24 MMOL/L (ref 21–32)
CREAT SERPL-MCNC: 1 MG/DL (ref 0.6–1.1)
D DIMER: <0.27 UG/ML FEU (ref 0–0.6)
DEPRECATED RDW RBC AUTO: 14.3 % (ref 12.4–15.4)
EOSINOPHIL # BLD: 0 K/UL (ref 0–0.6)
EOSINOPHIL NFR BLD: 0.2 %
GFR SERPLBLD CREATININE-BSD FMLA CKD-EPI: >60 ML/MIN/{1.73_M2}
GLUCOSE SERPL-MCNC: 128 MG/DL (ref 70–99)
HCT VFR BLD AUTO: 42.1 % (ref 36–48)
HGB BLD-MCNC: 14.3 G/DL (ref 12–16)
LYMPHOCYTES # BLD: 1.2 K/UL (ref 1–5.1)
LYMPHOCYTES NFR BLD: 12.8 %
MCH RBC QN AUTO: 29.2 PG (ref 26–34)
MCHC RBC AUTO-ENTMCNC: 34 G/DL (ref 31–36)
MCV RBC AUTO: 86 FL (ref 80–100)
MONOCYTES # BLD: 0.9 K/UL (ref 0–1.3)
MONOCYTES NFR BLD: 10.6 %
NEUTROPHILS # BLD: 6.8 K/UL (ref 1.7–7.7)
NEUTROPHILS NFR BLD: 76.2 %
NT-PROBNP SERPL-MCNC: 58 PG/ML (ref 0–124)
PLATELET # BLD AUTO: 153 K/UL (ref 135–450)
PMV BLD AUTO: 10.9 FL (ref 5–10.5)
POTASSIUM SERPL-SCNC: 4 MMOL/L (ref 3.5–5.1)
PROT SERPL-MCNC: 7.3 G/DL (ref 6.4–8.2)
RBC # BLD AUTO: 4.89 M/UL (ref 4–5.2)
SODIUM SERPL-SCNC: 140 MMOL/L (ref 136–145)
TROPONIN, HIGH SENSITIVITY: 7 NG/L (ref 0–14)
TROPONIN, HIGH SENSITIVITY: <6 NG/L (ref 0–14)
WBC # BLD AUTO: 9 K/UL (ref 4–11)

## 2023-08-15 PROCEDURE — 93005 ELECTROCARDIOGRAM TRACING: CPT | Performed by: EMERGENCY MEDICINE

## 2023-08-15 PROCEDURE — 85379 FIBRIN DEGRADATION QUANT: CPT

## 2023-08-15 PROCEDURE — 85025 COMPLETE CBC W/AUTO DIFF WBC: CPT

## 2023-08-15 PROCEDURE — 84484 ASSAY OF TROPONIN QUANT: CPT

## 2023-08-15 PROCEDURE — 71275 CT ANGIOGRAPHY CHEST: CPT

## 2023-08-15 PROCEDURE — 80053 COMPREHEN METABOLIC PANEL: CPT

## 2023-08-15 PROCEDURE — 99285 EMERGENCY DEPT VISIT HI MDM: CPT

## 2023-08-15 PROCEDURE — 83880 ASSAY OF NATRIURETIC PEPTIDE: CPT

## 2023-08-15 PROCEDURE — 6360000004 HC RX CONTRAST MEDICATION: Performed by: EMERGENCY MEDICINE

## 2023-08-15 PROCEDURE — 71046 X-RAY EXAM CHEST 2 VIEWS: CPT

## 2023-08-15 RX ORDER — NAPROXEN 500 MG/1
500 TABLET ORAL 2 TIMES DAILY
Qty: 15 TABLET | Refills: 0 | Status: SHIPPED | OUTPATIENT
Start: 2023-08-15

## 2023-08-15 RX ORDER — METHOCARBAMOL 500 MG/1
500 TABLET, FILM COATED ORAL 4 TIMES DAILY PRN
Qty: 40 TABLET | Refills: 0 | Status: SHIPPED | OUTPATIENT
Start: 2023-08-15 | End: 2023-08-25

## 2023-08-15 RX ADMIN — IOPAMIDOL 75 ML: 755 INJECTION, SOLUTION INTRAVENOUS at 18:53

## 2023-08-15 ASSESSMENT — PAIN DESCRIPTION - ONSET: ONSET: ON-GOING

## 2023-08-15 ASSESSMENT — PAIN - FUNCTIONAL ASSESSMENT
PAIN_FUNCTIONAL_ASSESSMENT: PREVENTS OR INTERFERES SOME ACTIVE ACTIVITIES AND ADLS
PAIN_FUNCTIONAL_ASSESSMENT: 0-10

## 2023-08-15 ASSESSMENT — PAIN SCALES - GENERAL: PAINLEVEL_OUTOF10: 4

## 2023-08-15 ASSESSMENT — PAIN DESCRIPTION - ORIENTATION: ORIENTATION: RIGHT

## 2023-08-15 ASSESSMENT — PAIN DESCRIPTION - DESCRIPTORS: DESCRIPTORS: ACHING

## 2023-08-15 ASSESSMENT — PAIN DESCRIPTION - LOCATION: LOCATION: CHEST

## 2023-08-15 ASSESSMENT — PAIN DESCRIPTION - FREQUENCY: FREQUENCY: CONTINUOUS

## 2023-08-15 NOTE — ED PROVIDER NOTES
radiographic evidence of acute cardiopulmonary pathology. ED BEDSIDE ULTRASOUND:   Performed by ED Physician - none    LABS:  Labs Reviewed   CBC WITH AUTO DIFFERENTIAL - Abnormal; Notable for the following components:       Result Value    MPV 10.9 (*)     All other components within normal limits   COMPREHENSIVE METABOLIC PANEL W/ REFLEX TO MG FOR LOW K - Abnormal; Notable for the following components:    Glucose 128 (*)     Albumin/Globulin Ratio 1.0 (*)     Alkaline Phosphatase 143 (*)     All other components within normal limits   TROPONIN   TROPONIN   D-DIMER, QUANTITATIVE   BRAIN NATRIURETIC PEPTIDE       All other labs were within normal range or not returned as of this dictation. EMERGENCY DEPARTMENT COURSE and DIFFERENTIAL DIAGNOSIS/ MEDICAL DECISION MAKING:   Vitals:    Vitals:    08/15/23 1835 08/15/23 1840 08/15/23 1900 08/15/23 1915   BP: (!) 140/88 (!) 140/90 (!) 141/95 (!) 136/90   Pulse: 82 66 88 70   Resp: 10 17 20 14   Temp:       TempSrc:       SpO2: 100% 100% 100% 100%   Weight:             MDM      The patient presents with dull constant right upper chest discomfort worsened with palpation and with movement of the right shoulder for the past 7 days. Pain is reproducible with palpation and with range of motion. No current shortness of breath. No current hypoxia. She is hemodynamically stable. EKG was obtained which reveals normal sinus rhythm with no acute change. Unchanged from prior tracings. She was given aspirin 324 mg p.o. She was also given Toradol 15 mg IV. Social Determinants of health were reviewed (Poverty, Education, uninsured) -none       Is this patient to be included in the SEP-1 Core Measure due to severe sepsis or septic shock? No   Exclusion criteria - the patient is NOT to be included for SEP-1 Core Measure due to:  2+ SIRS criteria are not met      REASSESSMENT/ MEDICAL DECISION MAKING          Lab results were reviewed.   White blood cell

## 2023-08-16 LAB
EKG ATRIAL RATE: 70 BPM
EKG DIAGNOSIS: NORMAL
EKG P AXIS: 40 DEGREES
EKG P-R INTERVAL: 140 MS
EKG Q-T INTERVAL: 376 MS
EKG QRS DURATION: 82 MS
EKG QTC CALCULATION (BAZETT): 406 MS
EKG R AXIS: -23 DEGREES
EKG T AXIS: 2 DEGREES
EKG VENTRICULAR RATE: 70 BPM

## 2023-08-16 PROCEDURE — 93010 ELECTROCARDIOGRAM REPORT: CPT | Performed by: INTERNAL MEDICINE

## 2023-08-16 NOTE — ED NOTES
Discharge and education instructions reviewed. Patient verbalized understanding, teach-back successful. Patient denied questions at this time. No acute distress noted. Patient instructed to follow-up as noted - return to emergency department if symptoms worsen. Patient verbalized understanding. Discharged per EDMD with discharge instructions.         Dalia Hernández RN  08/15/23 2048

## 2023-09-04 SDOH — ECONOMIC STABILITY: FOOD INSECURITY: WITHIN THE PAST 12 MONTHS, THE FOOD YOU BOUGHT JUST DIDN'T LAST AND YOU DIDN'T HAVE MONEY TO GET MORE.: NEVER TRUE

## 2023-09-04 SDOH — ECONOMIC STABILITY: TRANSPORTATION INSECURITY
IN THE PAST 12 MONTHS, HAS LACK OF TRANSPORTATION KEPT YOU FROM MEETINGS, WORK, OR FROM GETTING THINGS NEEDED FOR DAILY LIVING?: NO

## 2023-09-04 SDOH — ECONOMIC STABILITY: HOUSING INSECURITY
IN THE LAST 12 MONTHS, WAS THERE A TIME WHEN YOU DID NOT HAVE A STEADY PLACE TO SLEEP OR SLEPT IN A SHELTER (INCLUDING NOW)?: NO

## 2023-09-04 SDOH — ECONOMIC STABILITY: FOOD INSECURITY: WITHIN THE PAST 12 MONTHS, YOU WORRIED THAT YOUR FOOD WOULD RUN OUT BEFORE YOU GOT MONEY TO BUY MORE.: NEVER TRUE

## 2023-09-04 SDOH — ECONOMIC STABILITY: INCOME INSECURITY: HOW HARD IS IT FOR YOU TO PAY FOR THE VERY BASICS LIKE FOOD, HOUSING, MEDICAL CARE, AND HEATING?: NOT HARD AT ALL

## 2023-09-04 ASSESSMENT — PATIENT HEALTH QUESTIONNAIRE - PHQ9
SUM OF ALL RESPONSES TO PHQ QUESTIONS 1-9: 0
1. LITTLE INTEREST OR PLEASURE IN DOING THINGS: 0
SUM OF ALL RESPONSES TO PHQ QUESTIONS 1-9: 0
2. FEELING DOWN, DEPRESSED OR HOPELESS: 0
SUM OF ALL RESPONSES TO PHQ9 QUESTIONS 1 & 2: 0
2. FEELING DOWN, DEPRESSED OR HOPELESS: NOT AT ALL
SUM OF ALL RESPONSES TO PHQ QUESTIONS 1-9: 0
1. LITTLE INTEREST OR PLEASURE IN DOING THINGS: NOT AT ALL
SUM OF ALL RESPONSES TO PHQ QUESTIONS 1-9: 0
SUM OF ALL RESPONSES TO PHQ9 QUESTIONS 1 & 2: 0

## 2023-09-05 ENCOUNTER — OFFICE VISIT (OUTPATIENT)
Dept: FAMILY MEDICINE CLINIC | Age: 53
End: 2023-09-05
Payer: COMMERCIAL

## 2023-09-05 VITALS
OXYGEN SATURATION: 97 % | WEIGHT: 269 LBS | HEIGHT: 69 IN | SYSTOLIC BLOOD PRESSURE: 122 MMHG | HEART RATE: 80 BPM | RESPIRATION RATE: 18 BRPM | TEMPERATURE: 98.4 F | BODY MASS INDEX: 39.84 KG/M2 | DIASTOLIC BLOOD PRESSURE: 78 MMHG

## 2023-09-05 DIAGNOSIS — K76.0 NAFLD (NONALCOHOLIC FATTY LIVER DISEASE): ICD-10-CM

## 2023-09-05 DIAGNOSIS — R53.82 CHRONIC FATIGUE: ICD-10-CM

## 2023-09-05 DIAGNOSIS — Z13.220 SCREENING, LIPID: ICD-10-CM

## 2023-09-05 DIAGNOSIS — I10 ESSENTIAL HYPERTENSION: Primary | ICD-10-CM

## 2023-09-05 DIAGNOSIS — B96.89 ACUTE BACTERIAL SINUSITIS: ICD-10-CM

## 2023-09-05 DIAGNOSIS — R73.03 PREDIABETES: ICD-10-CM

## 2023-09-05 DIAGNOSIS — E66.01 CLASS 3 SEVERE OBESITY DUE TO EXCESS CALORIES WITH SERIOUS COMORBIDITY AND BODY MASS INDEX (BMI) OF 40.0 TO 44.9 IN ADULT (HCC): ICD-10-CM

## 2023-09-05 DIAGNOSIS — D69.3 IDIOPATHIC THROMBOCYTOPENIC PURPURA (HCC): ICD-10-CM

## 2023-09-05 DIAGNOSIS — J01.90 ACUTE BACTERIAL SINUSITIS: ICD-10-CM

## 2023-09-05 PROBLEM — E66.813 CLASS 3 SEVERE OBESITY DUE TO EXCESS CALORIES WITH SERIOUS COMORBIDITY AND BODY MASS INDEX (BMI) OF 40.0 TO 44.9 IN ADULT: Status: ACTIVE | Noted: 2019-06-10

## 2023-09-05 PROCEDURE — 3074F SYST BP LT 130 MM HG: CPT | Performed by: FAMILY MEDICINE

## 2023-09-05 PROCEDURE — 99214 OFFICE O/P EST MOD 30 MIN: CPT | Performed by: FAMILY MEDICINE

## 2023-09-05 PROCEDURE — 3078F DIAST BP <80 MM HG: CPT | Performed by: FAMILY MEDICINE

## 2023-09-05 RX ORDER — AMLODIPINE AND VALSARTAN 5; 160 MG/1; MG/1
1 TABLET ORAL DAILY
Qty: 90 TABLET | Refills: 1 | Status: SHIPPED | OUTPATIENT
Start: 2023-09-05

## 2023-09-05 RX ORDER — AMOXICILLIN 500 MG/1
1000 CAPSULE ORAL 2 TIMES DAILY
Qty: 40 CAPSULE | Refills: 0 | Status: SHIPPED | OUTPATIENT
Start: 2023-09-05 | End: 2023-09-15

## 2023-09-16 DIAGNOSIS — R73.03 PREDIABETES: ICD-10-CM

## 2023-09-16 DIAGNOSIS — D69.3 IDIOPATHIC THROMBOCYTOPENIC PURPURA (HCC): ICD-10-CM

## 2023-09-16 DIAGNOSIS — Z13.220 SCREENING, LIPID: ICD-10-CM

## 2023-09-16 DIAGNOSIS — K76.0 NAFLD (NONALCOHOLIC FATTY LIVER DISEASE): Primary | ICD-10-CM

## 2023-09-16 LAB
ALBUMIN SERPL-MCNC: 4 G/DL (ref 3.4–5)
ALBUMIN/GLOB SERPL: 1.5 {RATIO} (ref 1.1–2.2)
ALP SERPL-CCNC: 136 U/L (ref 40–129)
ALT SERPL-CCNC: 28 U/L (ref 10–40)
ANION GAP SERPL CALCULATED.3IONS-SCNC: 9 MMOL/L (ref 3–16)
AST SERPL-CCNC: 23 U/L (ref 15–37)
BASOPHILS # BLD: 0 K/UL (ref 0–0.2)
BASOPHILS NFR BLD: 0.4 %
BILIRUB SERPL-MCNC: 0.3 MG/DL (ref 0–1)
BUN SERPL-MCNC: 14 MG/DL (ref 7–20)
CALCIUM SERPL-MCNC: 9.8 MG/DL (ref 8.3–10.6)
CHLORIDE SERPL-SCNC: 111 MMOL/L (ref 99–110)
CHOLEST SERPL-MCNC: 171 MG/DL (ref 0–199)
CO2 SERPL-SCNC: 26 MMOL/L (ref 21–32)
CREAT SERPL-MCNC: 1 MG/DL (ref 0.6–1.1)
DEPRECATED RDW RBC AUTO: 14.2 % (ref 12.4–15.4)
EOSINOPHIL # BLD: 0 K/UL (ref 0–0.6)
EOSINOPHIL NFR BLD: 0.5 %
EST. AVERAGE GLUCOSE BLD GHB EST-MCNC: 125.5 MG/DL
GFR SERPLBLD CREATININE-BSD FMLA CKD-EPI: >60 ML/MIN/{1.73_M2}
GLUCOSE SERPL-MCNC: 97 MG/DL (ref 70–99)
HBA1C MFR BLD: 6 %
HCT VFR BLD AUTO: 41.3 % (ref 36–48)
HDLC SERPL-MCNC: 52 MG/DL (ref 40–60)
HGB BLD-MCNC: 13.7 G/DL (ref 12–16)
LDLC SERPL CALC-MCNC: 106 MG/DL
LYMPHOCYTES # BLD: 1.2 K/UL (ref 1–5.1)
LYMPHOCYTES NFR BLD: 21.1 %
MCH RBC QN AUTO: 29.2 PG (ref 26–34)
MCHC RBC AUTO-ENTMCNC: 33.3 G/DL (ref 31–36)
MCV RBC AUTO: 87.9 FL (ref 80–100)
MONOCYTES # BLD: 0.5 K/UL (ref 0–1.3)
MONOCYTES NFR BLD: 9.2 %
NEUTROPHILS # BLD: 4 K/UL (ref 1.7–7.7)
NEUTROPHILS NFR BLD: 68.8 %
PLATELET # BLD AUTO: 122 K/UL (ref 135–450)
PMV BLD AUTO: 11.9 FL (ref 5–10.5)
POTASSIUM SERPL-SCNC: 4.1 MMOL/L (ref 3.5–5.1)
PROT SERPL-MCNC: 6.7 G/DL (ref 6.4–8.2)
RBC # BLD AUTO: 4.7 M/UL (ref 4–5.2)
SODIUM SERPL-SCNC: 146 MMOL/L (ref 136–145)
TRIGL SERPL-MCNC: 67 MG/DL (ref 0–150)
VLDLC SERPL CALC-MCNC: 13 MG/DL
WBC # BLD AUTO: 5.8 K/UL (ref 4–11)

## 2023-09-21 NOTE — PROGRESS NOTES
I think DR Roshan Mclaughlin has reduced his office hours at the [facility] location, so she may have to drive to [place] to see him. But here is the contact info:    [address], DO  [address] [facility], [address]  Phone: [phone]    Thanks.

## 2024-01-02 ENCOUNTER — HOSPITAL ENCOUNTER (EMERGENCY)
Age: 54
Discharge: HOME | End: 2024-01-02
Attending: EMERGENCY MEDICINE
Payer: COMMERCIAL

## 2024-01-02 VITALS
SYSTOLIC BLOOD PRESSURE: 164 MMHG | BODY MASS INDEX: 40.53 KG/M2 | DIASTOLIC BLOOD PRESSURE: 102 MMHG | WEIGHT: 270.5 LBS | RESPIRATION RATE: 16 BRPM | HEART RATE: 78 BPM | TEMPERATURE: 98.4 F | OXYGEN SATURATION: 99 %

## 2024-01-02 DIAGNOSIS — S46.819A STRAIN OF TRAPEZIUS MUSCLE, UNSPECIFIED LATERALITY, INITIAL ENCOUNTER: Primary | ICD-10-CM
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
## 2024-01-02 PROCEDURE — 96372 THER/PROPH/DIAG INJ SC/IM: CPT

## 2024-01-02 PROCEDURE — 99284 EMERGENCY DEPT VISIT MOD MDM: CPT

## 2024-01-02 PROCEDURE — 6370000000 HC RX 637 (ALT 250 FOR IP): Performed by: EMERGENCY MEDICINE

## 2024-01-02 PROCEDURE — 6360000002 HC RX W HCPCS: Performed by: EMERGENCY MEDICINE

## 2024-01-02 RX ORDER — LIDOCAINE 50 MG/G
1 PATCH TOPICAL DAILY
Qty: 30 PATCH | Refills: 0 | Status: SHIPPED | OUTPATIENT
Start: 2024-01-02

## 2024-01-02 RX ORDER — IBUPROFEN 600 MG/1
600 TABLET ORAL EVERY 6 HOURS PRN
Qty: 30 TABLET | Refills: 0 | Status: SHIPPED | OUTPATIENT
Start: 2024-01-02

## 2024-01-02 RX ORDER — LIDOCAINE 4 G/G
1 PATCH TOPICAL ONCE
Status: DISCONTINUED | OUTPATIENT
Start: 2024-01-02 | End: 2024-01-02 | Stop reason: HOSPADM

## 2024-01-02 RX ORDER — KETOROLAC TROMETHAMINE 30 MG/ML
15 INJECTION, SOLUTION INTRAMUSCULAR; INTRAVENOUS ONCE
Status: COMPLETED | OUTPATIENT
Start: 2024-01-02 | End: 2024-01-02

## 2024-01-02 RX ADMIN — KETOROLAC TROMETHAMINE 15 MG: 30 INJECTION INTRAMUSCULAR; INTRAVENOUS at 18:49

## 2024-01-02 ASSESSMENT — PAIN - FUNCTIONAL ASSESSMENT: PAIN_FUNCTIONAL_ASSESSMENT: 0-10

## 2024-01-02 ASSESSMENT — PAIN SCALES - GENERAL
PAINLEVEL_OUTOF10: 5
PAINLEVEL_OUTOF10: 4

## 2024-01-02 ASSESSMENT — PAIN DESCRIPTION - DESCRIPTORS: DESCRIPTORS: ACHING

## 2024-01-02 ASSESSMENT — PAIN DESCRIPTION - LOCATION
LOCATION: GENERALIZED
LOCATION: BACK;SHOULDER

## 2024-01-02 ASSESSMENT — PAIN DESCRIPTION - ORIENTATION: ORIENTATION: RIGHT

## 2024-01-02 ASSESSMENT — LIFESTYLE VARIABLES: HOW OFTEN DO YOU HAVE A DRINK CONTAINING ALCOHOL: NEVER

## 2024-01-02 NOTE — DISCHARGE INSTRUCTIONS
Please stay hydrated.  Consider a massage.  See attached information for some stretching exercises which may also help.    Take the ibuprofen at least 3 times a day for the next 3 days.  You can use your muscle relaxers as needed.    Come back to the emergency department for any new or worsening symptoms, any other urgent concerns.
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
## 2024-03-06 ASSESSMENT — PATIENT HEALTH QUESTIONNAIRE - PHQ9
SUM OF ALL RESPONSES TO PHQ QUESTIONS 1-9: 0
SUM OF ALL RESPONSES TO PHQ9 QUESTIONS 1 & 2: 0
SUM OF ALL RESPONSES TO PHQ QUESTIONS 1-9: 0
2. FEELING DOWN, DEPRESSED OR HOPELESS: NOT AT ALL
1. LITTLE INTEREST OR PLEASURE IN DOING THINGS: NOT AT ALL
2. FEELING DOWN, DEPRESSED OR HOPELESS: 0
SUM OF ALL RESPONSES TO PHQ QUESTIONS 1-9: 0
1. LITTLE INTEREST OR PLEASURE IN DOING THINGS: 0
SUM OF ALL RESPONSES TO PHQ QUESTIONS 1-9: 0
SUM OF ALL RESPONSES TO PHQ9 QUESTIONS 1 & 2: 0

## 2024-03-07 ENCOUNTER — OFFICE VISIT (OUTPATIENT)
Dept: FAMILY MEDICINE CLINIC | Age: 54
End: 2024-03-07
Payer: COMMERCIAL

## 2024-03-07 VITALS
TEMPERATURE: 98.6 F | SYSTOLIC BLOOD PRESSURE: 140 MMHG | OXYGEN SATURATION: 95 % | DIASTOLIC BLOOD PRESSURE: 110 MMHG | BODY MASS INDEX: 40.28 KG/M2 | WEIGHT: 268.8 LBS | HEART RATE: 68 BPM

## 2024-03-07 DIAGNOSIS — M48.062 SPINAL STENOSIS OF LUMBAR REGION WITH NEUROGENIC CLAUDICATION: ICD-10-CM

## 2024-03-07 DIAGNOSIS — E66.01 CLASS 3 SEVERE OBESITY DUE TO EXCESS CALORIES WITH SERIOUS COMORBIDITY AND BODY MASS INDEX (BMI) OF 40.0 TO 44.9 IN ADULT (HCC): ICD-10-CM

## 2024-03-07 DIAGNOSIS — I10 ESSENTIAL HYPERTENSION: Primary | ICD-10-CM

## 2024-03-07 PROCEDURE — 3077F SYST BP >= 140 MM HG: CPT | Performed by: FAMILY MEDICINE

## 2024-03-07 PROCEDURE — 3080F DIAST BP >= 90 MM HG: CPT | Performed by: FAMILY MEDICINE

## 2024-03-07 PROCEDURE — 99214 OFFICE O/P EST MOD 30 MIN: CPT | Performed by: FAMILY MEDICINE

## 2024-03-07 RX ORDER — BLOOD PRESSURE TEST KIT-LARGE
KIT MISCELLANEOUS
Qty: 1 EACH | Refills: 0 | Status: SHIPPED | OUTPATIENT
Start: 2024-03-07

## 2024-03-07 RX ORDER — AMLODIPINE AND VALSARTAN 5; 320 MG/1; MG/1
1 TABLET ORAL DAILY
Qty: 30 TABLET | Refills: 3 | Status: SHIPPED | OUTPATIENT
Start: 2024-03-07

## 2024-03-07 RX ORDER — NALTREXONE HYDROCHLORIDE 50 MG/1
TABLET, FILM COATED ORAL
Qty: 15 TABLET | Refills: 3 | Status: SHIPPED | OUTPATIENT
Start: 2024-03-07

## 2024-03-07 NOTE — PROGRESS NOTES
comorbidity and body mass index (BMI) of 40.0 to 44.9 in adult (HCC)  - discussed wt loss at length. She plans intermittent fasting.   discussed Contrave- can incr naltrexone dose later and add Wellbutrin to approximate that med.  GLP-1 agonists are more effective, but poorly covered.        Return in about 2 months (around 5/7/2024) for follow up HTN.    An  electronicsignature was used to authenticate this note.    --Daniel Ricks MD on 3/7/2024 at 3:59 PM

## 2024-03-07 NOTE — PATIENT INSTRUCTIONS
Low Dose Naltrexone: look up Dr Hector Cruz's YouTube presentation. (Dr Cruz, OLIVIA).   https://www.youtube.com/watch?v=[email or web address]    Naltrexone 50 mg tab, crushed, suspended in 50 ml of distilled water.  This makes an oral liquid formulation at 1mg/1ml.  It is taken daily, at bedtime. (Mix with a flavored liquid if taste is unpleasant)  Start at 1 ml nightly.  Increase dose to 2, then 3, then 4, then 5 ml gradually as tolerated. (Usually a week at each dose, but can be longer if there are side effects).  Because there are so few preservatives, it should be stored in the fridge. It can last 3 months if frigerated.

## 2024-05-07 ENCOUNTER — OFFICE VISIT (OUTPATIENT)
Dept: FAMILY MEDICINE CLINIC | Age: 54
End: 2024-05-07
Payer: COMMERCIAL

## 2024-05-07 VITALS
HEART RATE: 79 BPM | DIASTOLIC BLOOD PRESSURE: 80 MMHG | BODY MASS INDEX: 39.71 KG/M2 | TEMPERATURE: 98.5 F | WEIGHT: 265 LBS | RESPIRATION RATE: 18 BRPM | OXYGEN SATURATION: 99 % | SYSTOLIC BLOOD PRESSURE: 120 MMHG

## 2024-05-07 DIAGNOSIS — I10 ESSENTIAL HYPERTENSION: Primary | ICD-10-CM

## 2024-05-07 DIAGNOSIS — R53.82 CHRONIC FATIGUE: ICD-10-CM

## 2024-05-07 DIAGNOSIS — I10 ESSENTIAL HYPERTENSION: ICD-10-CM

## 2024-05-07 DIAGNOSIS — M48.062 SPINAL STENOSIS OF LUMBAR REGION WITH NEUROGENIC CLAUDICATION: ICD-10-CM

## 2024-05-07 DIAGNOSIS — K76.0 NAFLD (NONALCOHOLIC FATTY LIVER DISEASE): ICD-10-CM

## 2024-05-07 LAB
ANION GAP SERPL CALCULATED.3IONS-SCNC: 12 MMOL/L (ref 3–16)
BUN SERPL-MCNC: 12 MG/DL (ref 7–20)
CALCIUM SERPL-MCNC: 10.2 MG/DL (ref 8.3–10.6)
CHLORIDE SERPL-SCNC: 109 MMOL/L (ref 99–110)
CO2 SERPL-SCNC: 23 MMOL/L (ref 21–32)
CREAT SERPL-MCNC: 0.9 MG/DL (ref 0.6–1.1)
GFR SERPLBLD CREATININE-BSD FMLA CKD-EPI: 76 ML/MIN/{1.73_M2}
GLUCOSE SERPL-MCNC: 107 MG/DL (ref 70–99)
POTASSIUM SERPL-SCNC: 4.8 MMOL/L (ref 3.5–5.1)
SODIUM SERPL-SCNC: 144 MMOL/L (ref 136–145)

## 2024-05-07 PROCEDURE — 3074F SYST BP LT 130 MM HG: CPT | Performed by: FAMILY MEDICINE

## 2024-05-07 PROCEDURE — 99214 OFFICE O/P EST MOD 30 MIN: CPT | Performed by: FAMILY MEDICINE

## 2024-05-07 PROCEDURE — 3079F DIAST BP 80-89 MM HG: CPT | Performed by: FAMILY MEDICINE

## 2024-05-07 RX ORDER — GABAPENTIN 300 MG/1
300 CAPSULE ORAL 2 TIMES DAILY
Qty: 60 CAPSULE | Refills: 2 | Status: SHIPPED | OUTPATIENT
Start: 2024-05-07 | End: 2024-08-05

## 2024-05-07 SDOH — ECONOMIC STABILITY: INCOME INSECURITY: HOW HARD IS IT FOR YOU TO PAY FOR THE VERY BASICS LIKE FOOD, HOUSING, MEDICAL CARE, AND HEATING?: NOT HARD AT ALL

## 2024-05-07 SDOH — ECONOMIC STABILITY: FOOD INSECURITY: WITHIN THE PAST 12 MONTHS, YOU WORRIED THAT YOUR FOOD WOULD RUN OUT BEFORE YOU GOT MONEY TO BUY MORE.: NEVER TRUE

## 2024-05-07 SDOH — ECONOMIC STABILITY: FOOD INSECURITY: WITHIN THE PAST 12 MONTHS, THE FOOD YOU BOUGHT JUST DIDN'T LAST AND YOU DIDN'T HAVE MONEY TO GET MORE.: NEVER TRUE

## 2024-05-07 ASSESSMENT — PATIENT HEALTH QUESTIONNAIRE - PHQ9
SUM OF ALL RESPONSES TO PHQ QUESTIONS 1-9: 0
1. LITTLE INTEREST OR PLEASURE IN DOING THINGS: NOT AT ALL
SUM OF ALL RESPONSES TO PHQ9 QUESTIONS 1 & 2: 0
SUM OF ALL RESPONSES TO PHQ QUESTIONS 1-9: 0

## 2024-05-07 NOTE — PATIENT INSTRUCTIONS
Naltrexone 50 mg tab, crushed, suspended in 50 ml of distilled water.  This makes an oral liquid formulation at 1mg/1ml.  It is taken daily, at bedtime. (Mix with a flavored liquid if taste is unpleasant)  Start at 1 ml nightly.  Increase dose to 2, then 3, then 4, then 5 ml gradually as tolerated. (Usually a week at each dose, but can be longer if there are side effects).  Because there are so few preservatives, it should be stored in the fridge. It can last 3 months if frigerated.

## 2024-05-07 NOTE — PROGRESS NOTES
Patient Specific Counseling (Will Not Stick From Patient To Patient): -\\nDiscussed pathology report from biopsy performed by Dr. Coles at .  Pt plans to f/u with Dr. Zuniga at .  He may still RTC here for tx, but will consider Dr. Zuniga's recommendations.
Drug use: No       Review of Systems No chest pains, dizziness, heart palpitations, dyspnea, lightheadedness, worsening edema.     Physical Exam  Vitals and nursing note reviewed.   Constitutional:       Appearance: Normal appearance. She is well-developed. She is obese.   Neck:      Thyroid: No thyromegaly.   Cardiovascular:      Rate and Rhythm: Normal rate and regular rhythm.      Pulses: Normal pulses.      Heart sounds: Normal heart sounds. No murmur heard.  Pulmonary:      Effort: Pulmonary effort is normal. No respiratory distress.      Breath sounds: Normal breath sounds. No wheezing or rales.   Musculoskeletal:         General: Normal range of motion.      Cervical back: Normal range of motion.   Skin:     General: Skin is warm and dry.   Neurological:      General: No focal deficit present.      Mental Status: She is alert and oriented to person, place, and time. Mental status is at baseline.   Psychiatric:         Mood and Affect: Mood normal.         Behavior: Behavior normal.         Thought Content: Thought content normal.         Judgment: Judgment normal.         ASSESSMENT/PLAN:    1. Essential hypertension  - Blood pressure is at goal on current therapy; continue meds and monitoring. Regular physical activity and healthy diet (low sodium, multiple servings of produce daily) was recommended.  Renal function to be assessed yearly.   - Basic Metabolic Panel; Future    2. Spinal stenosis of lumbar region with neurogenic claudication  - she is willing to consider trying low dose naltrexone (see AVS for dose). Has not tried   - has had NASREEN, still has pain.  Cost her $1300.  She is still having low back pain but it is no longer radicular.   No weakness.   Has been chronic for years.  PT did not help.  She is interested in consulting with a surgeon.  Chachochance also try gabapentin 300 bid      3. NAFLD (nonalcoholic fatty liver disease)  - discussed weight loss as the ultimate remedy for this.  If able to 
Detail Level: Detailed

## 2024-08-08 ENCOUNTER — OFFICE VISIT (OUTPATIENT)
Dept: FAMILY MEDICINE CLINIC | Age: 54
End: 2024-08-08
Payer: COMMERCIAL

## 2024-08-08 VITALS
HEART RATE: 84 BPM | OXYGEN SATURATION: 99 % | RESPIRATION RATE: 18 BRPM | BODY MASS INDEX: 39.8 KG/M2 | WEIGHT: 265.6 LBS | SYSTOLIC BLOOD PRESSURE: 150 MMHG | DIASTOLIC BLOOD PRESSURE: 82 MMHG

## 2024-08-08 DIAGNOSIS — R07.89 OTHER CHEST PAIN: ICD-10-CM

## 2024-08-08 DIAGNOSIS — R43.2 ABNORMAL TASTE IN MOUTH: ICD-10-CM

## 2024-08-08 DIAGNOSIS — B96.89 ACUTE BACTERIAL SINUSITIS: ICD-10-CM

## 2024-08-08 DIAGNOSIS — J01.90 ACUTE BACTERIAL SINUSITIS: ICD-10-CM

## 2024-08-08 DIAGNOSIS — I10 ESSENTIAL HYPERTENSION: Primary | ICD-10-CM

## 2024-08-08 DIAGNOSIS — J30.89 NON-SEASONAL ALLERGIC RHINITIS, UNSPECIFIED TRIGGER: ICD-10-CM

## 2024-08-08 PROCEDURE — 99214 OFFICE O/P EST MOD 30 MIN: CPT | Performed by: FAMILY MEDICINE

## 2024-08-08 PROCEDURE — 3079F DIAST BP 80-89 MM HG: CPT | Performed by: FAMILY MEDICINE

## 2024-08-08 PROCEDURE — 3077F SYST BP >= 140 MM HG: CPT | Performed by: FAMILY MEDICINE

## 2024-08-08 RX ORDER — FEXOFENADINE HCL 180 MG/1
180 TABLET ORAL DAILY
Qty: 90 TABLET | Refills: 1 | Status: SHIPPED | OUTPATIENT
Start: 2024-08-08

## 2024-08-08 RX ORDER — AMOXICILLIN AND CLAVULANATE POTASSIUM 875; 125 MG/1; MG/1
1 TABLET, FILM COATED ORAL 2 TIMES DAILY
Qty: 20 TABLET | Refills: 0 | Status: SHIPPED | OUTPATIENT
Start: 2024-08-08 | End: 2024-08-18

## 2024-08-08 NOTE — PROGRESS NOTES
2024    Blood pressure (!) 150/86, pulse 84, resp. rate 18, weight 120.5 kg (265 lb 9.6 oz), SpO2 99 %, not currently breastfeeding.    Ruthy Small (:  1970) is a 53 y.o. female, here for evaluation of the following medical concerns:    Chief Complaint   Patient presents with    Follow-up    Hypertension     Weight loss.  Having chest pain radiating thru upper back.  Headaches for last week.  Having diarrhea after meals.     Here for routine follow up of HTN. But 'I don't feel great' for past week.  Feels achy and tired- HA, loose stools.    Chest pains uppr right through to upper back- like a muscle pain.  The pain is gone by morning but builds throughout the day.  X 1 wk.  Pain is not worse with exertion.  Not with eating  Not with lying down.  Usually happens at rest. 'when I am sitting and watching TV after work' (she is usually eating at this time).    Has not found anything that makes the pain get less- but less overnight she thinks.  No cough, congestion or sore throat.  No n/v  No SOB  + abd aches and is off appetite  No ill contacts, but works in a [employer].    Has used Ibu 'a couple times' during the past week.    She is staking exforge for BP. Usually in evening.    Home BP's: not testing    Last renal function test:   Lab Results   Component Value Date/Time     2024 10:30 AM    K 4.8 2024 10:30 AM    K 4.0 08/15/2023 04:56 PM     2024 10:30 AM    CO2 23 2024 10:30 AM    BUN 12 2024 10:30 AM    CREATININE 0.9 2024 10:30 AM    GLUCOSE 107 2024 10:30 AM    CALCIUM 10.2 2024 10:30 AM    LABGLOM 76 2024 10:30 AM    LABGLOM >60 2023 09:30 AM          Notes 'foul taste' in mouth chronically.  Not an odor. Just a taste.  Food does not taste different.  She does brush tongue.  Uses oral rinses  Does not feel heartburn that often.  It is chronic- all the time. Not affected by eating.  Never a smoker.    She does tend to have bad

## 2024-09-19 ENCOUNTER — OFFICE VISIT (OUTPATIENT)
Dept: FAMILY MEDICINE CLINIC | Age: 54
End: 2024-09-19
Payer: COMMERCIAL

## 2024-09-19 VITALS
OXYGEN SATURATION: 97 % | HEART RATE: 67 BPM | WEIGHT: 260.6 LBS | RESPIRATION RATE: 18 BRPM | DIASTOLIC BLOOD PRESSURE: 76 MMHG | SYSTOLIC BLOOD PRESSURE: 124 MMHG | BODY MASS INDEX: 39.05 KG/M2

## 2024-09-19 DIAGNOSIS — I10 ESSENTIAL HYPERTENSION: Primary | ICD-10-CM

## 2024-09-19 DIAGNOSIS — Z13.220 SCREENING, LIPID: ICD-10-CM

## 2024-09-19 DIAGNOSIS — I10 ESSENTIAL HYPERTENSION: ICD-10-CM

## 2024-09-19 DIAGNOSIS — Z12.31 ENCOUNTER FOR SCREENING MAMMOGRAM FOR BREAST CANCER: ICD-10-CM

## 2024-09-19 DIAGNOSIS — R07.89 OTHER CHEST PAIN: ICD-10-CM

## 2024-09-19 DIAGNOSIS — R73.03 PREDIABETES: ICD-10-CM

## 2024-09-19 DIAGNOSIS — M54.12 CERVICAL RADICULOPATHY AT C5: ICD-10-CM

## 2024-09-19 LAB
ALBUMIN SERPL-MCNC: 4.2 G/DL (ref 3.4–5)
ALBUMIN/GLOB SERPL: 1.5 {RATIO} (ref 1.1–2.2)
ALP SERPL-CCNC: 153 U/L (ref 40–129)
ALT SERPL-CCNC: 21 U/L (ref 10–40)
ANION GAP SERPL CALCULATED.3IONS-SCNC: 12 MMOL/L (ref 3–16)
AST SERPL-CCNC: 17 U/L (ref 15–37)
BILIRUB SERPL-MCNC: <0.2 MG/DL (ref 0–1)
BUN SERPL-MCNC: 17 MG/DL (ref 7–20)
CALCIUM SERPL-MCNC: 10.2 MG/DL (ref 8.3–10.6)
CHLORIDE SERPL-SCNC: 109 MMOL/L (ref 99–110)
CHOLEST SERPL-MCNC: 191 MG/DL (ref 0–199)
CO2 SERPL-SCNC: 22 MMOL/L (ref 21–32)
CREAT SERPL-MCNC: 1 MG/DL (ref 0.6–1.1)
EST. AVERAGE GLUCOSE BLD GHB EST-MCNC: 122.6 MG/DL
GFR SERPLBLD CREATININE-BSD FMLA CKD-EPI: 67 ML/MIN/{1.73_M2}
GLUCOSE SERPL-MCNC: 94 MG/DL (ref 70–99)
HBA1C MFR BLD: 5.9 %
HDLC SERPL-MCNC: 58 MG/DL (ref 40–60)
LDLC SERPL CALC-MCNC: 122 MG/DL
POTASSIUM SERPL-SCNC: 4.5 MMOL/L (ref 3.5–5.1)
PROT SERPL-MCNC: 7 G/DL (ref 6.4–8.2)
SODIUM SERPL-SCNC: 143 MMOL/L (ref 136–145)
TRIGL SERPL-MCNC: 53 MG/DL (ref 0–150)
VLDLC SERPL CALC-MCNC: 11 MG/DL

## 2024-09-19 PROCEDURE — 3078F DIAST BP <80 MM HG: CPT | Performed by: FAMILY MEDICINE

## 2024-09-19 PROCEDURE — 3074F SYST BP LT 130 MM HG: CPT | Performed by: FAMILY MEDICINE

## 2024-09-19 PROCEDURE — 99214 OFFICE O/P EST MOD 30 MIN: CPT | Performed by: FAMILY MEDICINE

## 2024-09-19 RX ORDER — PREDNISONE 10 MG/1
TABLET ORAL
Qty: 30 TABLET | Refills: 0 | Status: SHIPPED | OUTPATIENT
Start: 2024-09-19 | End: 2024-09-29

## 2024-09-19 RX ORDER — AMLODIPINE AND VALSARTAN 5; 320 MG/1; MG/1
1 TABLET ORAL DAILY
Qty: 30 TABLET | Refills: 3 | Status: SHIPPED | OUTPATIENT
Start: 2024-09-19

## 2024-09-23 ENCOUNTER — HOSPITAL ENCOUNTER (OUTPATIENT)
Age: 54
Discharge: HOME | End: 2024-09-23
Payer: COMMERCIAL

## 2024-09-23 ENCOUNTER — HOSPITAL ENCOUNTER (OUTPATIENT)
Dept: GENERAL RADIOLOGY | Age: 54
Discharge: HOME | End: 2024-09-23
Attending: FAMILY MEDICINE
Payer: COMMERCIAL

## 2024-09-23 DIAGNOSIS — M54.12 CERVICAL RADICULOPATHY AT C5: ICD-10-CM

## 2024-09-23 DIAGNOSIS — R07.89 OTHER CHEST PAIN: ICD-10-CM

## 2024-09-23 PROCEDURE — 72040 X-RAY EXAM NECK SPINE 2-3 VW: CPT

## 2024-09-23 RX ORDER — AMLODIPINE AND VALSARTAN 5; 320 MG/1; MG/1
1 TABLET ORAL DAILY
Qty: 30 TABLET | Refills: 3 | OUTPATIENT
Start: 2024-09-23

## 2024-09-24 ENCOUNTER — TELEPHONE (OUTPATIENT)
Dept: FAMILY MEDICINE CLINIC | Age: 54
End: 2024-09-24

## 2024-10-04 ENCOUNTER — TELEPHONE (OUTPATIENT)
Dept: FAMILY MEDICINE CLINIC | Age: 54
End: 2024-10-04

## 2024-10-04 NOTE — TELEPHONE ENCOUNTER
Pt states this was discussed at appt on 9/19  Still having rt side breast pain  Needs orders for dx imaging so she can complete mammogram

## 2024-10-04 NOTE — TELEPHONE ENCOUNTER
Jenny from [employer] calling in, stated that pt was turned away from [facility] for her mammogram in September due to having issues with right breast. Pt is calling in trying to schedule however central sched is not able to schedule generic screening. They are needing order changed to a bilateral diagnostic mammo with a Right US.    Please advise

## 2024-10-04 NOTE — TELEPHONE ENCOUNTER
Called pt and apologized for the confusion  Went ahead and scheduled her mammogram for her  She is all set

## 2024-10-04 NOTE — TELEPHONE ENCOUNTER
Pt calling in, would like to speak with MA  Advised pt will have someone reachout to her regarding Dr. Ricks's message. Diana is gone for the day    Pt would like someone to call her back asap

## 2024-10-04 NOTE — TELEPHONE ENCOUNTER
She had chest pain, not breast pain at her visit.  If she has pain in her breast, that is different from what we discussed and evaluated.    I diagnosed her with a cervical radiculitis as a cause for her chest and back- it was reproduced by neck positions.    So I don't think she needs a diagnostic mammogram.    If her pain is different and now involves the breast, I would recommend evaluation before scheduling breast cancer screening.    Thanks.

## 2024-11-13 ENCOUNTER — HOSPITAL ENCOUNTER (OUTPATIENT)
Dept: WOMENS IMAGING | Age: 54
Discharge: HOME | End: 2024-11-13
Attending: FAMILY MEDICINE
Payer: COMMERCIAL

## 2024-11-13 VITALS — WEIGHT: 263 LBS | BODY MASS INDEX: 39.86 KG/M2 | HEIGHT: 68 IN

## 2024-11-13 DIAGNOSIS — Z12.31 ENCOUNTER FOR SCREENING MAMMOGRAM FOR BREAST CANCER: ICD-10-CM

## 2024-11-13 PROCEDURE — 77067 SCR MAMMO BI INCL CAD: CPT

## 2024-12-04 PROBLEM — E55.9 VITAMIN D DEFICIENCY: Status: ACTIVE | Noted: 2024-12-04

## 2025-01-23 ENCOUNTER — OFFICE VISIT (OUTPATIENT)
Dept: FAMILY MEDICINE CLINIC | Age: 55
End: 2025-01-23

## 2025-01-23 VITALS
DIASTOLIC BLOOD PRESSURE: 90 MMHG | HEART RATE: 89 BPM | HEIGHT: 69 IN | RESPIRATION RATE: 18 BRPM | TEMPERATURE: 98.6 F | BODY MASS INDEX: 39.75 KG/M2 | OXYGEN SATURATION: 98 % | SYSTOLIC BLOOD PRESSURE: 120 MMHG | WEIGHT: 268.4 LBS

## 2025-01-23 DIAGNOSIS — Z01.818 PREOP EXAMINATION: Primary | ICD-10-CM

## 2025-01-23 DIAGNOSIS — K76.0 NAFLD (NONALCOHOLIC FATTY LIVER DISEASE): ICD-10-CM

## 2025-01-23 DIAGNOSIS — E55.9 VITAMIN D DEFICIENCY: ICD-10-CM

## 2025-01-23 DIAGNOSIS — R73.03 PREDIABETES: ICD-10-CM

## 2025-01-23 DIAGNOSIS — I10 ESSENTIAL HYPERTENSION: ICD-10-CM

## 2025-01-23 DIAGNOSIS — M51.362 DEGENERATION OF INTERVERTEBRAL DISC OF LUMBAR REGION WITH DISCOGENIC BACK PAIN AND LOWER EXTREMITY PAIN: ICD-10-CM

## 2025-01-23 LAB
ALBUMIN SERPL-MCNC: 4.1 G/DL (ref 3.4–5)
ALBUMIN/GLOB SERPL: 1.3 {RATIO} (ref 1.1–2.2)
ALP SERPL-CCNC: 133 U/L (ref 40–129)
ALT SERPL-CCNC: 19 U/L (ref 10–40)
ANION GAP SERPL CALCULATED.3IONS-SCNC: 9 MMOL/L (ref 3–16)
AST SERPL-CCNC: 23 U/L (ref 15–37)
BILIRUB SERPL-MCNC: 0.3 MG/DL (ref 0–1)
BUN SERPL-MCNC: 14 MG/DL (ref 7–20)
CALCIUM SERPL-MCNC: 10.2 MG/DL (ref 8.3–10.6)
CHLORIDE SERPL-SCNC: 108 MMOL/L (ref 99–110)
CO2 SERPL-SCNC: 27 MMOL/L (ref 21–32)
CREAT SERPL-MCNC: 1.1 MG/DL (ref 0.6–1.1)
GFR SERPLBLD CREATININE-BSD FMLA CKD-EPI: 59 ML/MIN/{1.73_M2}
GLUCOSE SERPL-MCNC: 100 MG/DL (ref 70–99)
POTASSIUM SERPL-SCNC: 3.8 MMOL/L (ref 3.5–5.1)
PROT SERPL-MCNC: 7.3 G/DL (ref 6.4–8.2)
SODIUM SERPL-SCNC: 144 MMOL/L (ref 136–145)

## 2025-01-23 PROCEDURE — 3080F DIAST BP >= 90 MM HG: CPT | Performed by: FAMILY MEDICINE

## 2025-01-23 PROCEDURE — 3074F SYST BP LT 130 MM HG: CPT | Performed by: FAMILY MEDICINE

## 2025-01-23 PROCEDURE — 93000 ELECTROCARDIOGRAM COMPLETE: CPT | Performed by: FAMILY MEDICINE

## 2025-01-23 PROCEDURE — 99214 OFFICE O/P EST MOD 30 MIN: CPT | Performed by: FAMILY MEDICINE

## 2025-01-23 SDOH — ECONOMIC STABILITY: FOOD INSECURITY: WITHIN THE PAST 12 MONTHS, YOU WORRIED THAT YOUR FOOD WOULD RUN OUT BEFORE YOU GOT MONEY TO BUY MORE.: NEVER TRUE

## 2025-01-23 SDOH — ECONOMIC STABILITY: FOOD INSECURITY: WITHIN THE PAST 12 MONTHS, THE FOOD YOU BOUGHT JUST DIDN'T LAST AND YOU DIDN'T HAVE MONEY TO GET MORE.: NEVER TRUE

## 2025-01-23 ASSESSMENT — PATIENT HEALTH QUESTIONNAIRE - PHQ9
SUM OF ALL RESPONSES TO PHQ QUESTIONS 1-9: 0
1. LITTLE INTEREST OR PLEASURE IN DOING THINGS: NOT AT ALL
SUM OF ALL RESPONSES TO PHQ QUESTIONS 1-9: 0
SUM OF ALL RESPONSES TO PHQ QUESTIONS 1-9: 0
SUM OF ALL RESPONSES TO PHQ9 QUESTIONS 1 & 2: 0
2. FEELING DOWN, DEPRESSED OR HOPELESS: NOT AT ALL
SUM OF ALL RESPONSES TO PHQ QUESTIONS 1-9: 0

## 2025-01-23 NOTE — PROGRESS NOTES
Subjective:   PREOPERATIVE EVALUATION     Ruthy Small is a 54 y.o.  female who presents to the office today for a preoperative consultation at the request of surgeon Dr Seth Poole who plans on performing lumbar fusion on January 30.      Duration of problem: back pain for 4 yrs.  Bilat radiculopathy. MRI shows L4-5 disc bulge contacting L4 nerve roots  Assoc sx are:  pain, weakness     Alleviating factors are:  failed epidural steroids, meds, PT    This consultation is requested for the specific conditions prompting preoperative evaluation (i.e. because of potential affect on operative risk):    Diabetes:  no  Coronary Artery Disease:  no  COPD/Asthma:  no  Tobacco use?  no  Recent illness?  no    Other notable conditions:    HTN: on exforge 5-320.  No hx of CAD, TIA, CVA or PVD.   Last renal function test:  normal.  Lab Results   Component Value Date/Time     09/19/2024 09:11 AM    K 4.5 09/19/2024 09:11 AM    K 4.0 08/15/2023 04:56 PM     09/19/2024 09:11 AM    CO2 22 09/19/2024 09:11 AM    BUN 17 09/19/2024 09:11 AM    CREATININE 1.0 09/19/2024 09:11 AM    GLUCOSE 94 09/19/2024 09:11 AM    CALCIUM 10.2 09/19/2024 09:11 AM    LABGLOM 67 09/19/2024 09:11 AM    LABGLOM >60 09/16/2023 09:30 AM          Hx prediabetes:  not progressing  Lab Results   Component Value Date/Time    LABA1C 5.9 09/19/2024 09:11 AM    LABA1C 6.0 09/16/2023 09:30 AM    LABA1C 6.0 04/12/2022 08:08 AM      Taking caltrate bid for vit D deficiency.    Takes allegra for seasonal allergies    No hx DARRIN    Patient Active Problem List   Diagnosis    Essential hypertension    H/O colonoscopy 2004 (for IBS).  benign polyps removed.    Sprain of anterior talofibular ligament of left ankle    Menopausal symptoms    Class 3 severe obesity due to excess calories with serious comorbidity and body mass index (BMI) of 40.0 to 44.9 in adult    Irritable bowel syndrome with diarrhea    Idiopathic thrombocytopenic purpura

## 2025-01-23 NOTE — ASSESSMENT & PLAN NOTE
BP is controlled. Hold dose of exforge the evening before surgery.  Recheck metabolic panel    Orders:    Comprehensive Metabolic Panel; Future

## 2025-01-24 LAB
EST. AVERAGE GLUCOSE BLD GHB EST-MCNC: 116.9 MG/DL
HBA1C MFR BLD: 5.7 %

## 2025-01-28 ENCOUNTER — TELEPHONE (OUTPATIENT)
Dept: FAMILY MEDICINE CLINIC | Age: 55
End: 2025-01-28

## 2025-01-28 NOTE — TELEPHONE ENCOUNTER
Please notify Ruthy LUCAS Small that I have ordered an antibiotic for her sinus infection (Augmentin) to [facility].    Thankfully, the preop H&P is good for 60 days, which should allow her to  reschedule without needing a new preop H&P.    But she will want to verify this with nataliya.    Thanks.

## 2025-01-28 NOTE — TELEPHONE ENCOUNTER
Patient seen last week for pre op.  Was advised to use OTC meds and this has not helped.  Surgery is now post ponded  and the patient is requesting an antibiotic to be sent in as she is still having the same symptoms.

## 2025-01-28 NOTE — TELEPHONE ENCOUNTER
PT WAS SEEN LAST WEEK FOR A PRE OP BUT DR. LOVE TOLD HER SHE HAD A SINUS INFECTION AND TO TAKE ALLEGRA  SHE IS TAKING THAT AND IT IS NOT WORKING  PTS SURGERY IS BEING POSTPONE DUE TO BEING SICK  CAN THE PT GET SOMETHING CALLED IN TO HER PHARM FOR THE SINUS INFECTION  CONGESTION, THROAT IS SORE IT HURTS TO SWALLOW, RUNNING NOSE AND YELLOWISH PHLEGM   PHARM IS CONFIRMED  [facility]

## 2025-03-07 ENCOUNTER — OFFICE VISIT (OUTPATIENT)
Dept: FAMILY MEDICINE CLINIC | Age: 55
End: 2025-03-07

## 2025-03-07 VITALS
SYSTOLIC BLOOD PRESSURE: 132 MMHG | WEIGHT: 262 LBS | OXYGEN SATURATION: 98 % | HEART RATE: 76 BPM | BODY MASS INDEX: 39.26 KG/M2 | RESPIRATION RATE: 18 BRPM | DIASTOLIC BLOOD PRESSURE: 80 MMHG

## 2025-03-07 DIAGNOSIS — D69.3 IDIOPATHIC THROMBOCYTOPENIC PURPURA (HCC): ICD-10-CM

## 2025-03-07 DIAGNOSIS — M51.362 DEGENERATION OF INTERVERTEBRAL DISC OF LUMBAR REGION WITH DISCOGENIC BACK PAIN AND LOWER EXTREMITY PAIN: ICD-10-CM

## 2025-03-07 DIAGNOSIS — R73.03 PREDIABETES: ICD-10-CM

## 2025-03-07 DIAGNOSIS — R43.2 ABNORMAL TASTE IN MOUTH: ICD-10-CM

## 2025-03-07 DIAGNOSIS — I10 ESSENTIAL HYPERTENSION: ICD-10-CM

## 2025-03-07 DIAGNOSIS — E55.9 VITAMIN D DEFICIENCY: ICD-10-CM

## 2025-03-07 DIAGNOSIS — Z01.818 PRE-OP EXAM: Primary | ICD-10-CM

## 2025-03-07 RX ORDER — AZELASTINE HYDROCHLORIDE, FLUTICASONE PROPIONATE 137; 50 UG/1; UG/1
2 SPRAY, METERED NASAL 2 TIMES DAILY
Qty: 23 G | Refills: 5 | Status: SHIPPED | OUTPATIENT
Start: 2025-03-07

## 2025-03-07 ASSESSMENT — ENCOUNTER SYMPTOMS
GASTROINTESTINAL NEGATIVE: 1
RESPIRATORY NEGATIVE: 1
ALLERGIC/IMMUNOLOGIC NEGATIVE: 1
BACK PAIN: 1
EYES NEGATIVE: 1

## 2025-03-07 NOTE — ASSESSMENT & PLAN NOTE
Her recent CBC was normal.  Has order for repeat  cbc from neurosurgery.    Lab Results   Component Value Date    WBC 8.5 01/23/2025    HGB 13.8 01/23/2025    HCT 41.1 01/23/2025    MCV 89.2 01/23/2025     01/23/2025

## 2025-03-07 NOTE — PROGRESS NOTES
Subjective:   PREOPERATIVE EVALUATION     Ruthy Small is a 54 y.o.  female who presents to the office today for a preoperative consultation at the request of surgeon Dr Seth Poole who plans on performing Lumbar fusion on March 13.      Duration of problem: back pain for 4 yrs.  Bilat radiculopathy. MRI shows L4-5 disc bulge contacting L4 nerve roots  Assoc sx are:  pain, weakness     Alleviating factors are:  failed epidural steroids, meds, PT    This consultation is requested for the specific conditions prompting preoperative evaluation (i.e. because of potential affect on operative risk):    Diabetes:  no  Coronary Artery Disease:  no  COPD/Asthma:  no  Tobacco use?  no  Recent illness?  No (had sinus infection in jan that was treated with Augmentin- with complete resolution of her symptoms.)    Other notable conditions:    HTN: takes Exforge 5-320.  No hx of CAD, TIA, CVA or PVD.   Last renal function test: normal.  Lab Results   Component Value Date/Time     01/23/2025 06:12 PM    K 3.8 01/23/2025 06:12 PM    K 4.0 08/15/2023 04:56 PM     01/23/2025 06:12 PM    CO2 27 01/23/2025 06:12 PM    BUN 14 01/23/2025 06:12 PM    CREATININE 1.1 01/23/2025 06:12 PM    GLUCOSE 100 01/23/2025 06:12 PM    CALCIUM 10.2 01/23/2025 06:12 PM    LABGLOM 59 01/23/2025 06:12 PM    LABGLOM >60 09/16/2023 09:30 AM        Hx prediabetes; A1c has been decreasing:   Lab Results   Component Value Date/Time    LABA1C 5.7 01/23/2025 06:12 PM    LABA1C 5.9 09/19/2024 09:11 AM    LABA1C 6.0 09/16/2023 09:30 AM      Taking weekly vit D 00181 IU for vit D deficiency.    Vit D was 7.5 I December 2024 and 12.8 in Feb 2025. (Started D2 50,000 2 wks ago)    Hx DORINA: has not yet flared this spring.  Has used Flonase and allegra in the past. Notes chronic phlegm, d/c and bad taste in mouth.      Patient Active Problem List   Diagnosis    Essential hypertension    H/O colonoscopy 2004 (for IBS).  benign polyps

## 2025-03-11 ENCOUNTER — TELEPHONE (OUTPATIENT)
Dept: ADMINISTRATIVE | Age: 55
End: 2025-03-11

## 2025-03-11 NOTE — TELEPHONE ENCOUNTER
Submitted PA for Azelastine-Fluticasone 137-50MCG/ACT suspension  Via [facility] [id number] STATUS: PENDING.    Outcome  Additional Information Required  AZELASTINE-FLUTICASONE 137-50 MCG SPRAY/PUMP is not covered for this Member. For formulary alternatives, please view the Member's corresponding formulary at [email or web address] or call us at [phone]    If this requires a response please respond to the pool ( P [facility] MEDICATION PRE-AUTH).      Thank you please advise patient.  .

## 2025-04-07 ENCOUNTER — TELEPHONE (OUTPATIENT)
Dept: ORTHOPEDIC SURGERY | Age: 55
End: 2025-04-07

## 2025-05-12 RX ORDER — AMLODIPINE AND VALSARTAN 5; 320 MG/1; MG/1
1 TABLET ORAL DAILY
Qty: 30 TABLET | Refills: 5 | Status: SHIPPED | OUTPATIENT
Start: 2025-05-12

## 2025-05-22 ENCOUNTER — OFFICE VISIT (OUTPATIENT)
Dept: FAMILY MEDICINE CLINIC | Age: 55
End: 2025-05-22
Payer: COMMERCIAL

## 2025-05-22 VITALS
OXYGEN SATURATION: 99 % | HEART RATE: 91 BPM | WEIGHT: 261 LBS | SYSTOLIC BLOOD PRESSURE: 134 MMHG | HEIGHT: 69 IN | BODY MASS INDEX: 38.66 KG/M2 | DIASTOLIC BLOOD PRESSURE: 82 MMHG

## 2025-05-22 DIAGNOSIS — I10 ESSENTIAL HYPERTENSION: ICD-10-CM

## 2025-05-22 DIAGNOSIS — R51.9 ACUTE INTRACTABLE HEADACHE, UNSPECIFIED HEADACHE TYPE: Primary | ICD-10-CM

## 2025-05-22 DIAGNOSIS — R43.2 DYSGEUSIA: ICD-10-CM

## 2025-05-22 PROCEDURE — 99214 OFFICE O/P EST MOD 30 MIN: CPT | Performed by: FAMILY MEDICINE

## 2025-05-22 PROCEDURE — 3075F SYST BP GE 130 - 139MM HG: CPT | Performed by: FAMILY MEDICINE

## 2025-05-22 PROCEDURE — 3079F DIAST BP 80-89 MM HG: CPT | Performed by: FAMILY MEDICINE

## 2025-05-22 RX ORDER — AZITHROMYCIN 250 MG/1
TABLET, FILM COATED ORAL
Qty: 1 PACKET | Refills: 0 | Status: SHIPPED | OUTPATIENT
Start: 2025-05-22

## 2025-05-22 RX ORDER — PREDNISONE 10 MG/1
TABLET ORAL
Qty: 30 TABLET | Refills: 0 | Status: SHIPPED | OUTPATIENT
Start: 2025-05-22 | End: 2025-06-01

## 2025-05-22 NOTE — PROGRESS NOTES
2025    Blood pressure 134/82, pulse 91, height 1.74 m (5' 8.5\"), weight 118.4 kg (261 lb), SpO2 99%, not currently breastfeeding.    Ruthy Small (:  1970) is a 54 y.o. female, here for evaluation of the following medical concerns:    Chief Complaint   Patient presents with    Hypertension     F/u BP check     Here for routine follow up of HTN.  This is her first visit since her lumbar fusion surgery done on 3/13.  She is sore and stiff, but less pain.  Is happy she had it done.    She has reduced truncal ROM and is limited in her physical activity still.    No surgical complications.    New problem: 'headaches every day' since the surgery.  Located frontotemporal. + nauseated.   This can lead to abd cramping and diarrhea.  Often located to R side only.  No RN or tearing  Improved when she is sleeping and wakes without the headache. But it returns in the evening hours.   It happens every day.  She has history of headaches in the past that are frequent but not addressed medically.    Had a sinus infection in . Still has 'awful taste' in her mouth that is chronic.  Denies drainage, RN, cough, throat clearing.    Is not taking medicines for pain.  Only med right now is exforge as below.  Not using sinus/allergy meds.    Patient Active Problem List   Diagnosis    Essential hypertension    H/O colonoscopy  (for IBS).  benign polyps removed.    Sprain of anterior talofibular ligament of left ankle    Menopausal symptoms    Class 3 severe obesity due to excess calories with serious comorbidity and body mass index (BMI) of 40.0 to 44.9 in adult (HCC)    Irritable bowel syndrome with diarrhea    Idiopathic thrombocytopenic purpura (HCC) mild, sees Dr Calvin    Plantar fasciitis, left    NAFLD (nonalcoholic fatty liver disease) sono     Prediabetes    DDD (degenerative disc disease), lumbar    Spinal stenosis of lumbar region with neurogenic claudication    Chronic fatigue    Non-seasonal

## 2025-05-22 NOTE — ASSESSMENT & PLAN NOTE
Cause is not clear. Look for olfactory obstruction.  Does not have sx of heartburn but occasionally.  May need to see ENT if she has severe sinus disease    Orders:    CT HEAD WO CONTRAST; Future    CT SINUS WO CONTRAST; Future

## 2025-06-04 ENCOUNTER — HOSPITAL ENCOUNTER (OUTPATIENT)
Dept: CT IMAGING | Age: 55
Discharge: HOME | End: 2025-06-04
Attending: FAMILY MEDICINE
Payer: COMMERCIAL

## 2025-06-04 DIAGNOSIS — R51.9 ACUTE INTRACTABLE HEADACHE, UNSPECIFIED HEADACHE TYPE: ICD-10-CM

## 2025-06-04 DIAGNOSIS — R43.2 DYSGEUSIA: ICD-10-CM

## 2025-06-04 PROCEDURE — 70450 CT HEAD/BRAIN W/O DYE: CPT

## 2025-06-04 PROCEDURE — 70486 CT MAXILLOFACIAL W/O DYE: CPT

## 2025-06-05 ENCOUNTER — RESULTS FOLLOW-UP (OUTPATIENT)
Dept: FAMILY MEDICINE CLINIC | Age: 55
End: 2025-06-05

## (undated) DEVICE — SNARE ENDOSCP W10MMXL240CM SHTH DIA24MM RND INSUL STIFF

## (undated) DEVICE — FORCEPS BX 240CM 2.4MM L NDL RAD JAW 4 M00513334